# Patient Record
Sex: FEMALE | Race: WHITE | HISPANIC OR LATINO | Employment: OTHER | URBAN - METROPOLITAN AREA
[De-identification: names, ages, dates, MRNs, and addresses within clinical notes are randomized per-mention and may not be internally consistent; named-entity substitution may affect disease eponyms.]

---

## 2018-01-01 ENCOUNTER — ANESTHESIA (OUTPATIENT)
Dept: ENDOSCOPY | Facility: HOSPITAL | Age: 68
DRG: 020 | End: 2018-01-01
Payer: MEDICAID

## 2018-01-01 ENCOUNTER — ANESTHESIA EVENT (OUTPATIENT)
Dept: ENDOSCOPY | Facility: HOSPITAL | Age: 68
DRG: 020 | End: 2018-01-01
Payer: MEDICAID

## 2018-01-01 ENCOUNTER — HOSPITAL ENCOUNTER (INPATIENT)
Facility: HOSPITAL | Age: 68
LOS: 1 days | DRG: 020 | End: 2018-05-25
Attending: EMERGENCY MEDICINE | Admitting: PSYCHIATRY & NEUROLOGY
Payer: MEDICAID

## 2018-01-01 VITALS
RESPIRATION RATE: 19 BRPM | SYSTOLIC BLOOD PRESSURE: 201 MMHG | OXYGEN SATURATION: 99 % | HEIGHT: 60 IN | WEIGHT: 155 LBS | TEMPERATURE: 98 F | HEART RATE: 62 BPM | DIASTOLIC BLOOD PRESSURE: 68 MMHG | BODY MASS INDEX: 30.43 KG/M2

## 2018-01-01 DIAGNOSIS — G44.53 PRIMARY THUNDERCLAP HEADACHE: ICD-10-CM

## 2018-01-01 DIAGNOSIS — I60.9 SUBARACHNOID HEMORRHAGE: Primary | ICD-10-CM

## 2018-01-01 DIAGNOSIS — I63.9 STROKE: ICD-10-CM

## 2018-01-01 DIAGNOSIS — I10 ESSENTIAL HYPERTENSION: ICD-10-CM

## 2018-01-01 DIAGNOSIS — R07.9 CHEST PAIN: ICD-10-CM

## 2018-01-01 DIAGNOSIS — I60.7 CEREBRAL ANEURYSM RUPTURE: ICD-10-CM

## 2018-01-01 LAB
ABO + RH BLD: NORMAL
ALBUMIN SERPL BCP-MCNC: 3.5 G/DL
ALBUMIN SERPL BCP-MCNC: 3.9 G/DL
ALLENS TEST: ABNORMAL
ALP SERPL-CCNC: 132 U/L
ALP SERPL-CCNC: 138 U/L
ALT SERPL W/O P-5'-P-CCNC: 32 U/L
ALT SERPL W/O P-5'-P-CCNC: 34 U/L
ANION GAP SERPL CALC-SCNC: 10 MMOL/L
ANION GAP SERPL CALC-SCNC: 13 MMOL/L
APTT BLDCRRT: 21.6 SEC
APTT BLDCRRT: 22.2 SEC
APTT BLDCRRT: <21 SEC
AST SERPL-CCNC: 32 U/L
AST SERPL-CCNC: 38 U/L
BASOPHILS # BLD AUTO: 0.03 K/UL
BASOPHILS # BLD AUTO: 0.05 K/UL
BASOPHILS NFR BLD: 0.3 %
BASOPHILS NFR BLD: 0.4 %
BILIRUB SERPL-MCNC: 0.6 MG/DL
BILIRUB SERPL-MCNC: 0.7 MG/DL
BLD GP AB SCN CELLS X3 SERPL QL: NORMAL
BUN SERPL-MCNC: 15 MG/DL
BUN SERPL-MCNC: 9 MG/DL
CALCIUM SERPL-MCNC: 8.7 MG/DL
CALCIUM SERPL-MCNC: 8.9 MG/DL
CHLORIDE SERPL-SCNC: 106 MMOL/L
CHLORIDE SERPL-SCNC: 114 MMOL/L
CHOLEST SERPL-MCNC: 185 MG/DL
CHOLEST/HDLC SERPL: 6 {RATIO}
CO2 SERPL-SCNC: 18 MMOL/L
CO2 SERPL-SCNC: 23 MMOL/L
CREAT SERPL-MCNC: 0.7 MG/DL
CREAT SERPL-MCNC: 0.8 MG/DL
DELSYS: ABNORMAL
DIFFERENTIAL METHOD: ABNORMAL
DIFFERENTIAL METHOD: ABNORMAL
EOSINOPHIL # BLD AUTO: 0 K/UL
EOSINOPHIL # BLD AUTO: 0.2 K/UL
EOSINOPHIL NFR BLD: 0.1 %
EOSINOPHIL NFR BLD: 2 %
ERYTHROCYTE [DISTWIDTH] IN BLOOD BY AUTOMATED COUNT: 14.1 %
ERYTHROCYTE [DISTWIDTH] IN BLOOD BY AUTOMATED COUNT: 14.2 %
ERYTHROCYTE [SEDIMENTATION RATE] IN BLOOD BY WESTERGREN METHOD: 12 MM/H
EST. GFR  (AFRICAN AMERICAN): >60 ML/MIN/1.73 M^2
EST. GFR  (AFRICAN AMERICAN): >60 ML/MIN/1.73 M^2
EST. GFR  (NON AFRICAN AMERICAN): >60 ML/MIN/1.73 M^2
EST. GFR  (NON AFRICAN AMERICAN): >60 ML/MIN/1.73 M^2
ESTIMATED AVG GLUCOSE: 114 MG/DL
FIO2: 40
GLUCOSE SERPL-MCNC: 162 MG/DL
GLUCOSE SERPL-MCNC: 345 MG/DL
HBA1C MFR BLD HPLC: 5.6 %
HCO3 UR-SCNC: 17.9 MMOL/L (ref 24–28)
HCT VFR BLD AUTO: 43.5 %
HCT VFR BLD AUTO: 44.4 %
HDLC SERPL-MCNC: 31 MG/DL
HDLC SERPL: 16.8 %
HGB BLD-MCNC: 14.3 G/DL
HGB BLD-MCNC: 14.7 G/DL
IMM GRANULOCYTES # BLD AUTO: 0.07 K/UL
IMM GRANULOCYTES NFR BLD AUTO: 0.5 %
INR PPP: 1
LDLC SERPL CALC-MCNC: 124.8 MG/DL
LYMPHOCYTES # BLD AUTO: 1.3 K/UL
LYMPHOCYTES # BLD AUTO: 1.6 K/UL
LYMPHOCYTES NFR BLD: 21.5 %
LYMPHOCYTES NFR BLD: 8.6 %
MAGNESIUM SERPL-MCNC: 2.1 MG/DL
MCH RBC QN AUTO: 30.5 PG
MCH RBC QN AUTO: 30.6 PG
MCHC RBC AUTO-ENTMCNC: 32.9 G/DL
MCHC RBC AUTO-ENTMCNC: 33.1 G/DL
MCV RBC AUTO: 92 FL
MCV RBC AUTO: 93 FL
MODE: ABNORMAL
MONOCYTES # BLD AUTO: 0.3 K/UL
MONOCYTES # BLD AUTO: 0.9 K/UL
MONOCYTES NFR BLD: 3.5 %
MONOCYTES NFR BLD: 6 %
NEUTROPHILS # BLD AUTO: 13 K/UL
NEUTROPHILS # BLD AUTO: 5.4 K/UL
NEUTROPHILS NFR BLD: 72.6 %
NEUTROPHILS NFR BLD: 84.5 %
NONHDLC SERPL-MCNC: 154 MG/DL
NRBC BLD-RTO: 0 /100 WBC
PCO2 BLDA: 30.1 MMHG (ref 35–45)
PEEP: 5
PH SMN: 7.38 [PH] (ref 7.35–7.45)
PHOSPHATE SERPL-MCNC: 1.6 MG/DL
PLATELET # BLD AUTO: 191 K/UL
PLATELET # BLD AUTO: 283 K/UL
PMV BLD AUTO: 11.5 FL
PMV BLD AUTO: 11.5 FL
PO2 BLDA: 115 MMHG (ref 80–100)
POC BE: -7 MMOL/L
POC SATURATED O2: 99 % (ref 95–100)
POC TCO2: 19 MMOL/L (ref 23–27)
POCT GLUCOSE: 255 MG/DL (ref 70–110)
POCT GLUCOSE: 300 MG/DL (ref 70–110)
POTASSIUM SERPL-SCNC: 3.1 MMOL/L
POTASSIUM SERPL-SCNC: 3.9 MMOL/L
PROT SERPL-MCNC: 7.3 G/DL
PROT SERPL-MCNC: 7.6 G/DL
PROTHROMBIN TIME: 10.7 SEC
PROTHROMBIN TIME: 10.9 SEC
PROTHROMBIN TIME: 10.9 SEC
RBC # BLD AUTO: 4.68 M/UL
RBC # BLD AUTO: 4.82 M/UL
SAMPLE: ABNORMAL
SITE: ABNORMAL
SODIUM SERPL-SCNC: 139 MMOL/L
SODIUM SERPL-SCNC: 145 MMOL/L
SP02: 100
T4 FREE SERPL-MCNC: 0.68 NG/DL
TRIGL SERPL-MCNC: 146 MG/DL
TROPONIN I SERPL DL<=0.01 NG/ML-MCNC: <0.006 NG/ML
TSH SERPL DL<=0.005 MIU/L-ACNC: 8.93 UIU/ML
VT: 440
WBC # BLD AUTO: 15.38 K/UL
WBC # BLD AUTO: 7.43 K/UL

## 2018-01-01 PROCEDURE — 85610 PROTHROMBIN TIME: CPT

## 2018-01-01 PROCEDURE — 25000003 PHARM REV CODE 250: Performed by: STUDENT IN AN ORGANIZED HEALTH CARE EDUCATION/TRAINING PROGRAM

## 2018-01-01 PROCEDURE — 86901 BLOOD TYPING SEROLOGIC RH(D): CPT

## 2018-01-01 PROCEDURE — 94761 N-INVAS EAR/PLS OXIMETRY MLT: CPT

## 2018-01-01 PROCEDURE — 99233 SBSQ HOSP IP/OBS HIGH 50: CPT | Mod: ,,, | Performed by: PSYCHIATRY & NEUROLOGY

## 2018-01-01 PROCEDURE — 99900026 HC AIRWAY MAINTENANCE (STAT)

## 2018-01-01 PROCEDURE — 99233 SBSQ HOSP IP/OBS HIGH 50: CPT | Mod: ,,, | Performed by: NEUROLOGICAL SURGERY

## 2018-01-01 PROCEDURE — 82803 BLOOD GASES ANY COMBINATION: CPT

## 2018-01-01 PROCEDURE — 63600175 PHARM REV CODE 636 W HCPCS: Performed by: STUDENT IN AN ORGANIZED HEALTH CARE EDUCATION/TRAINING PROGRAM

## 2018-01-01 PROCEDURE — 85025 COMPLETE CBC W/AUTO DIFF WBC: CPT

## 2018-01-01 PROCEDURE — 31500 INSERT EMERGENCY AIRWAY: CPT | Mod: ,,, | Performed by: PSYCHIATRY & NEUROLOGY

## 2018-01-01 PROCEDURE — 84443 ASSAY THYROID STIM HORMONE: CPT

## 2018-01-01 PROCEDURE — 63600175 PHARM REV CODE 636 W HCPCS: Performed by: PHYSICIAN ASSISTANT

## 2018-01-01 PROCEDURE — 96366 THER/PROPH/DIAG IV INF ADDON: CPT

## 2018-01-01 PROCEDURE — 25000003 PHARM REV CODE 250: Performed by: PHYSICIAN ASSISTANT

## 2018-01-01 PROCEDURE — 85730 THROMBOPLASTIN TIME PARTIAL: CPT

## 2018-01-01 PROCEDURE — A4216 STERILE WATER/SALINE, 10 ML: HCPCS | Performed by: STUDENT IN AN ORGANIZED HEALTH CARE EDUCATION/TRAINING PROGRAM

## 2018-01-01 PROCEDURE — 83735 ASSAY OF MAGNESIUM: CPT

## 2018-01-01 PROCEDURE — 96365 THER/PROPH/DIAG IV INF INIT: CPT

## 2018-01-01 PROCEDURE — B31FYZZ FLUOROSCOPY OF LEFT VERTEBRAL ARTERY USING OTHER CONTRAST: ICD-10-PCS | Performed by: RADIOLOGY

## 2018-01-01 PROCEDURE — 94003 VENT MGMT INPAT SUBQ DAY: CPT

## 2018-01-01 PROCEDURE — B315YZZ FLUOROSCOPY OF BILATERAL COMMON CAROTID ARTERIES USING OTHER CONTRAST: ICD-10-PCS | Performed by: RADIOLOGY

## 2018-01-01 PROCEDURE — 25000003 PHARM REV CODE 250

## 2018-01-01 PROCEDURE — 99291 CRITICAL CARE FIRST HOUR: CPT | Mod: 25,,, | Performed by: PSYCHIATRY & NEUROLOGY

## 2018-01-01 PROCEDURE — 93010 ELECTROCARDIOGRAM REPORT: CPT | Mod: ,,, | Performed by: INTERNAL MEDICINE

## 2018-01-01 PROCEDURE — 27000221 HC OXYGEN, UP TO 24 HOURS

## 2018-01-01 PROCEDURE — 5A1945Z RESPIRATORY VENTILATION, 24-96 CONSECUTIVE HOURS: ICD-10-PCS | Performed by: PSYCHIATRY & NEUROLOGY

## 2018-01-01 PROCEDURE — 83036 HEMOGLOBIN GLYCOSYLATED A1C: CPT

## 2018-01-01 PROCEDURE — D9220A PRA ANESTHESIA: Mod: ANES,,, | Performed by: ANESTHESIOLOGY

## 2018-01-01 PROCEDURE — 80061 LIPID PANEL: CPT

## 2018-01-01 PROCEDURE — 99900035 HC TECH TIME PER 15 MIN (STAT)

## 2018-01-01 PROCEDURE — 99233 SBSQ HOSP IP/OBS HIGH 50: CPT | Mod: ,,, | Performed by: PHYSICIAN ASSISTANT

## 2018-01-01 PROCEDURE — 36620 INSERTION CATHETER ARTERY: CPT | Mod: ,,, | Performed by: PSYCHIATRY & NEUROLOGY

## 2018-01-01 PROCEDURE — 96375 TX/PRO/DX INJ NEW DRUG ADDON: CPT | Mod: 59

## 2018-01-01 PROCEDURE — 85610 PROTHROMBIN TIME: CPT | Mod: 91

## 2018-01-01 PROCEDURE — 99291 CRITICAL CARE FIRST HOUR: CPT | Mod: 25

## 2018-01-01 PROCEDURE — 63600175 PHARM REV CODE 636 W HCPCS

## 2018-01-01 PROCEDURE — 37000008 HC ANESTHESIA 1ST 15 MINUTES

## 2018-01-01 PROCEDURE — S0017 INJECTION, AMINOCAPROIC ACID: HCPCS | Performed by: PSYCHIATRY & NEUROLOGY

## 2018-01-01 PROCEDURE — 96374 THER/PROPH/DIAG INJ IV PUSH: CPT | Mod: 59

## 2018-01-01 PROCEDURE — 84439 ASSAY OF FREE THYROXINE: CPT

## 2018-01-01 PROCEDURE — 99232 SBSQ HOSP IP/OBS MODERATE 35: CPT | Mod: ,,, | Performed by: NEUROLOGICAL SURGERY

## 2018-01-01 PROCEDURE — 0BH17EZ INSERTION OF ENDOTRACHEAL AIRWAY INTO TRACHEA, VIA NATURAL OR ARTIFICIAL OPENING: ICD-10-PCS | Performed by: PSYCHIATRY & NEUROLOGY

## 2018-01-01 PROCEDURE — 00H632Z INSERTION OF MONITORING DEVICE INTO CEREBRAL VENTRICLE, PERCUTANEOUS APPROACH: ICD-10-PCS | Performed by: PSYCHIATRY & NEUROLOGY

## 2018-01-01 PROCEDURE — 25000003 PHARM REV CODE 250: Performed by: NURSE ANESTHETIST, CERTIFIED REGISTERED

## 2018-01-01 PROCEDURE — 80053 COMPREHEN METABOLIC PANEL: CPT

## 2018-01-01 PROCEDURE — 94002 VENT MGMT INPAT INIT DAY: CPT

## 2018-01-01 PROCEDURE — 63600175 PHARM REV CODE 636 W HCPCS: Performed by: NURSE ANESTHETIST, CERTIFIED REGISTERED

## 2018-01-01 PROCEDURE — 37799 UNLISTED PX VASCULAR SURGERY: CPT

## 2018-01-01 PROCEDURE — D9220A PRA ANESTHESIA: Mod: CRNA,,, | Performed by: NURSE ANESTHETIST, CERTIFIED REGISTERED

## 2018-01-01 PROCEDURE — B318YZZ FLUOROSCOPY OF BILATERAL INTERNAL CAROTID ARTERIES USING OTHER CONTRAST: ICD-10-PCS | Performed by: RADIOLOGY

## 2018-01-01 PROCEDURE — 25000003 PHARM REV CODE 250: Performed by: RADIOLOGY

## 2018-01-01 PROCEDURE — 25000003 PHARM REV CODE 250: Performed by: PSYCHIATRY & NEUROLOGY

## 2018-01-01 PROCEDURE — 4A103BD MONITORING OF INTRACRANIAL PRESSURE, PERCUTANEOUS APPROACH: ICD-10-PCS | Performed by: PSYCHIATRY & NEUROLOGY

## 2018-01-01 PROCEDURE — 25500020 PHARM REV CODE 255: Performed by: PSYCHIATRY & NEUROLOGY

## 2018-01-01 PROCEDURE — 20000000 HC ICU ROOM

## 2018-01-01 PROCEDURE — 99292 CRITICAL CARE ADDL 30 MIN: CPT | Mod: 25,,, | Performed by: PSYCHIATRY & NEUROLOGY

## 2018-01-01 PROCEDURE — 63600175 PHARM REV CODE 636 W HCPCS: Performed by: EMERGENCY MEDICINE

## 2018-01-01 PROCEDURE — 03VG3DZ RESTRICTION OF INTRACRANIAL ARTERY WITH INTRALUMINAL DEVICE, PERCUTANEOUS APPROACH: ICD-10-PCS | Performed by: RADIOLOGY

## 2018-01-01 PROCEDURE — 37000009 HC ANESTHESIA EA ADD 15 MINS

## 2018-01-01 PROCEDURE — 25000003 PHARM REV CODE 250: Performed by: EMERGENCY MEDICINE

## 2018-01-01 PROCEDURE — 85730 THROMBOPLASTIN TIME PARTIAL: CPT | Mod: 91

## 2018-01-01 PROCEDURE — 84100 ASSAY OF PHOSPHORUS: CPT

## 2018-01-01 PROCEDURE — 84484 ASSAY OF TROPONIN QUANT: CPT

## 2018-01-01 RX ORDER — GLYCOPYRROLATE 0.2 MG/ML
0.2 INJECTION INTRAMUSCULAR; INTRAVENOUS ONCE
Status: DISCONTINUED | OUTPATIENT
Start: 2018-01-01 | End: 2018-01-01

## 2018-01-01 RX ORDER — LEVETIRACETAM 5 MG/ML
500 INJECTION INTRAVASCULAR EVERY 12 HOURS
Status: DISCONTINUED | OUTPATIENT
Start: 2018-01-01 | End: 2018-01-01

## 2018-01-01 RX ORDER — NICARDIPINE HYDROCHLORIDE 0.2 MG/ML
5 INJECTION INTRAVENOUS CONTINUOUS
Status: DISCONTINUED | OUTPATIENT
Start: 2018-01-01 | End: 2018-01-01

## 2018-01-01 RX ORDER — SODIUM CHLORIDE 0.9 % (FLUSH) 0.9 %
5 SYRINGE (ML) INJECTION
Status: DISCONTINUED | OUTPATIENT
Start: 2018-01-01 | End: 2018-01-01

## 2018-01-01 RX ORDER — SODIUM CHLORIDE 0.9 % (FLUSH) 0.9 %
3 SYRINGE (ML) INJECTION EVERY 8 HOURS
Status: DISCONTINUED | OUTPATIENT
Start: 2018-01-01 | End: 2018-01-01

## 2018-01-01 RX ORDER — NEOSTIGMINE METHYLSULFATE 1 MG/ML
2 INJECTION, SOLUTION INTRAVENOUS ONCE
Status: DISCONTINUED | OUTPATIENT
Start: 2018-01-01 | End: 2018-01-01

## 2018-01-01 RX ORDER — NICARDIPINE HYDROCHLORIDE 0.2 MG/ML
1 INJECTION INTRAVENOUS CONTINUOUS
Status: DISCONTINUED | OUTPATIENT
Start: 2018-01-01 | End: 2018-01-01

## 2018-01-01 RX ORDER — MIDAZOLAM HYDROCHLORIDE 1 MG/ML
INJECTION, SOLUTION INTRAMUSCULAR; INTRAVENOUS
Status: DISCONTINUED | OUTPATIENT
Start: 2018-01-01 | End: 2018-01-01

## 2018-01-01 RX ORDER — SODIUM,POTASSIUM PHOSPHATES 280-250MG
2 POWDER IN PACKET (EA) ORAL
Status: DISCONTINUED | OUTPATIENT
Start: 2018-01-01 | End: 2018-01-01

## 2018-01-01 RX ORDER — NAPROXEN SODIUM 220 MG/1
81 TABLET, FILM COATED ORAL DAILY
Status: DISCONTINUED | OUTPATIENT
Start: 2018-01-01 | End: 2018-01-01

## 2018-01-01 RX ORDER — FENTANYL CITRATE 50 UG/ML
50 INJECTION, SOLUTION INTRAMUSCULAR; INTRAVENOUS
Status: COMPLETED | OUTPATIENT
Start: 2018-01-01 | End: 2018-01-01

## 2018-01-01 RX ORDER — LABETALOL HYDROCHLORIDE 5 MG/ML
10 INJECTION, SOLUTION INTRAVENOUS EVERY 4 HOURS PRN
Status: DISCONTINUED | OUTPATIENT
Start: 2018-01-01 | End: 2018-01-01

## 2018-01-01 RX ORDER — ETOMIDATE 2 MG/ML
30 INJECTION INTRAVENOUS ONCE
Status: COMPLETED | OUTPATIENT
Start: 2018-01-01 | End: 2018-01-01

## 2018-01-01 RX ORDER — ATROPINE SULFATE 0.1 MG/ML
1 INJECTION INTRAVENOUS ONCE
Status: COMPLETED | OUTPATIENT
Start: 2018-01-01 | End: 2018-01-01

## 2018-01-01 RX ORDER — ATROPINE SULFATE 0.1 MG/ML
INJECTION INTRAVENOUS
Status: COMPLETED
Start: 2018-01-01 | End: 2018-01-01

## 2018-01-01 RX ORDER — NOREPINEPHRINE BITARTRATE/D5W 4MG/250ML
PLASTIC BAG, INJECTION (ML) INTRAVENOUS
Status: COMPLETED
Start: 2018-01-01 | End: 2018-01-01

## 2018-01-01 RX ORDER — POTASSIUM CHLORIDE 20 MEQ/15ML
40 SOLUTION ORAL
Status: DISCONTINUED | OUTPATIENT
Start: 2018-01-01 | End: 2018-01-01

## 2018-01-01 RX ORDER — MORPHINE SULFATE 2 MG/ML
1 INJECTION, SOLUTION INTRAMUSCULAR; INTRAVENOUS
Status: DISCONTINUED | OUTPATIENT
Start: 2018-01-01 | End: 2018-01-01 | Stop reason: HOSPADM

## 2018-01-01 RX ORDER — NOREPINEPHRINE BITARTRATE/D5W 4MG/250ML
0.02 PLASTIC BAG, INJECTION (ML) INTRAVENOUS CONTINUOUS
Status: DISCONTINUED | OUTPATIENT
Start: 2018-01-01 | End: 2018-01-01

## 2018-01-01 RX ORDER — FAMOTIDINE 20 MG/1
20 TABLET, FILM COATED ORAL 2 TIMES DAILY
Status: DISCONTINUED | OUTPATIENT
Start: 2018-01-01 | End: 2018-01-01

## 2018-01-01 RX ORDER — ETOMIDATE 2 MG/ML
INJECTION INTRAVENOUS
Status: DISCONTINUED
Start: 2018-01-01 | End: 2018-01-01 | Stop reason: WASHOUT

## 2018-01-01 RX ORDER — HEPARIN SODIUM 1000 [USP'U]/ML
INJECTION, SOLUTION INTRAVENOUS; SUBCUTANEOUS
Status: DISCONTINUED | OUTPATIENT
Start: 2018-01-01 | End: 2018-01-01

## 2018-01-01 RX ORDER — PROPOFOL 10 MG/ML
VIAL (ML) INTRAVENOUS
Status: DISCONTINUED | OUTPATIENT
Start: 2018-01-01 | End: 2018-01-01

## 2018-01-01 RX ORDER — MANNITOL 250 MG/ML
75 INJECTION, SOLUTION INTRAVENOUS ONCE
Status: COMPLETED | OUTPATIENT
Start: 2018-01-01 | End: 2018-01-01

## 2018-01-01 RX ORDER — ACETAMINOPHEN 10 MG/ML
1000 INJECTION, SOLUTION INTRAVENOUS ONCE
Status: COMPLETED | OUTPATIENT
Start: 2018-01-01 | End: 2018-01-01

## 2018-01-01 RX ORDER — ROCURONIUM BROMIDE 10 MG/ML
INJECTION, SOLUTION INTRAVENOUS
Status: DISCONTINUED | OUTPATIENT
Start: 2018-01-01 | End: 2018-01-01

## 2018-01-01 RX ORDER — GLYCOPYRROLATE 0.2 MG/ML
0.1 INJECTION INTRAMUSCULAR; INTRAVENOUS
Status: DISCONTINUED | OUTPATIENT
Start: 2018-01-01 | End: 2018-01-01 | Stop reason: HOSPADM

## 2018-01-01 RX ORDER — PROPOFOL 10 MG/ML
5 INJECTION, EMULSION INTRAVENOUS CONTINUOUS
Status: DISCONTINUED | OUTPATIENT
Start: 2018-01-01 | End: 2018-01-01

## 2018-01-01 RX ORDER — FENTANYL CITRATE 50 UG/ML
INJECTION, SOLUTION INTRAMUSCULAR; INTRAVENOUS
Status: COMPLETED
Start: 2018-01-01 | End: 2018-01-01

## 2018-01-01 RX ORDER — ROCURONIUM BROMIDE 10 MG/ML
INJECTION, SOLUTION INTRAVENOUS
Status: DISCONTINUED
Start: 2018-01-01 | End: 2018-01-01 | Stop reason: WASHOUT

## 2018-01-01 RX ORDER — PROCHLORPERAZINE EDISYLATE 5 MG/ML
10 INJECTION INTRAMUSCULAR; INTRAVENOUS ONCE
Status: COMPLETED | OUTPATIENT
Start: 2018-01-01 | End: 2018-01-01

## 2018-01-01 RX ORDER — LABETALOL HYDROCHLORIDE 5 MG/ML
10 INJECTION, SOLUTION INTRAVENOUS
Status: DISCONTINUED | OUTPATIENT
Start: 2018-01-01 | End: 2018-01-01

## 2018-01-01 RX ORDER — LANOLIN ALCOHOL/MO/W.PET/CERES
800 CREAM (GRAM) TOPICAL
Status: DISCONTINUED | OUTPATIENT
Start: 2018-01-01 | End: 2018-01-01

## 2018-01-01 RX ORDER — LIDOCAINE HYDROCHLORIDE AND EPINEPHRINE 10; 10 MG/ML; UG/ML
20 INJECTION, SOLUTION INFILTRATION; PERINEURAL ONCE
Status: DISCONTINUED | OUTPATIENT
Start: 2018-01-01 | End: 2018-01-01

## 2018-01-01 RX ORDER — MANNITOL 250 MG/ML
70 INJECTION, SOLUTION INTRAVENOUS ONCE
Status: DISCONTINUED | OUTPATIENT
Start: 2018-01-01 | End: 2018-01-01

## 2018-01-01 RX ORDER — HYDRALAZINE HYDROCHLORIDE 20 MG/ML
10 INJECTION INTRAMUSCULAR; INTRAVENOUS EVERY 4 HOURS PRN
Status: DISCONTINUED | OUTPATIENT
Start: 2018-01-01 | End: 2018-01-01

## 2018-01-01 RX ORDER — FENTANYL CITRATE 50 UG/ML
INJECTION, SOLUTION INTRAMUSCULAR; INTRAVENOUS
Status: DISCONTINUED | OUTPATIENT
Start: 2018-01-01 | End: 2018-01-01

## 2018-01-01 RX ORDER — ROCURONIUM BROMIDE 10 MG/ML
INJECTION, SOLUTION INTRAVENOUS
Status: DISPENSED
Start: 2018-01-01 | End: 2018-01-01

## 2018-01-01 RX ORDER — PROPOFOL 10 MG/ML
INJECTION, EMULSION INTRAVENOUS
Status: DISPENSED
Start: 2018-01-01 | End: 2018-01-01

## 2018-01-01 RX ORDER — METOCLOPRAMIDE HYDROCHLORIDE 5 MG/ML
10 INJECTION INTRAMUSCULAR; INTRAVENOUS EVERY 6 HOURS PRN
Status: DISCONTINUED | OUTPATIENT
Start: 2018-01-01 | End: 2018-01-01 | Stop reason: HOSPADM

## 2018-01-01 RX ORDER — MANNITOL 250 MG/ML
INJECTION, SOLUTION INTRAVENOUS
Status: COMPLETED
Start: 2018-01-01 | End: 2018-01-01

## 2018-01-01 RX ORDER — PHENYLEPHRINE HYDROCHLORIDE 10 MG/ML
INJECTION INTRAVENOUS
Status: DISPENSED
Start: 2018-01-01 | End: 2018-01-01

## 2018-01-01 RX ORDER — ASPIRIN 300 MG/1
300 SUPPOSITORY RECTAL ONCE
Status: COMPLETED | OUTPATIENT
Start: 2018-01-01 | End: 2018-01-01

## 2018-01-01 RX ORDER — PHENYLEPHRINE HCL IN 0.9% NACL 1 MG/10 ML
SYRINGE (ML) INTRAVENOUS
Status: COMPLETED
Start: 2018-01-01 | End: 2018-01-01

## 2018-01-01 RX ORDER — ROCURONIUM BROMIDE 10 MG/ML
1 INJECTION, SOLUTION INTRAVENOUS ONCE
Status: COMPLETED | OUTPATIENT
Start: 2018-01-01 | End: 2018-01-01

## 2018-01-01 RX ORDER — LORAZEPAM 1 MG/1
1 TABLET ORAL EVERY 30 MIN PRN
Status: DISCONTINUED | OUTPATIENT
Start: 2018-01-01 | End: 2018-01-01 | Stop reason: HOSPADM

## 2018-01-01 RX ORDER — ONDANSETRON 2 MG/ML
8 INJECTION INTRAMUSCULAR; INTRAVENOUS
Status: COMPLETED | OUTPATIENT
Start: 2018-01-01 | End: 2018-01-01

## 2018-01-01 RX ORDER — FENTANYL CITRATE 50 UG/ML
INJECTION, SOLUTION INTRAMUSCULAR; INTRAVENOUS
Status: DISCONTINUED
Start: 2018-01-01 | End: 2018-01-01 | Stop reason: WASHOUT

## 2018-01-01 RX ORDER — AMOXICILLIN 250 MG
1 CAPSULE ORAL 2 TIMES DAILY
Status: DISCONTINUED | OUTPATIENT
Start: 2018-01-01 | End: 2018-01-01

## 2018-01-01 RX ADMIN — FENTANYL CITRATE 50 MCG: 50 INJECTION, SOLUTION INTRAMUSCULAR; INTRAVENOUS at 04:05

## 2018-01-01 RX ADMIN — Medication 3 ML: at 10:05

## 2018-01-01 RX ADMIN — ETOMIDATE 30 MG: 2 INJECTION, SOLUTION INTRAVENOUS at 02:05

## 2018-01-01 RX ADMIN — FENTANYL CITRATE 100 MCG: 50 INJECTION, SOLUTION INTRAMUSCULAR; INTRAVENOUS at 02:05

## 2018-01-01 RX ADMIN — ASPIRIN 81 MG 81 MG: 81 TABLET ORAL at 09:05

## 2018-01-01 RX ADMIN — ROCURONIUM BROMIDE 70 MG: 10 INJECTION, SOLUTION INTRAVENOUS at 02:05

## 2018-01-01 RX ADMIN — ROCURONIUM BROMIDE 30 MG: 10 INJECTION, SOLUTION INTRAVENOUS at 04:05

## 2018-01-01 RX ADMIN — MIDAZOLAM HYDROCHLORIDE 2 MG: 1 INJECTION, SOLUTION INTRAMUSCULAR; INTRAVENOUS at 04:05

## 2018-01-01 RX ADMIN — FENTANYL CITRATE 50 MCG: 50 INJECTION, SOLUTION INTRAMUSCULAR; INTRAVENOUS at 10:05

## 2018-01-01 RX ADMIN — ACETAMINOPHEN 1000 MG: 10 INJECTION, SOLUTION INTRAVENOUS at 12:05

## 2018-01-01 RX ADMIN — LEVETIRACETAM 500 MG: 5 INJECTION INTRAVENOUS at 09:05

## 2018-01-01 RX ADMIN — PROPOFOL 50 MG: 10 INJECTION, EMULSION INTRAVENOUS at 04:05

## 2018-01-01 RX ADMIN — POTASSIUM CHLORIDE 40 MEQ: 20 SOLUTION ORAL at 09:05

## 2018-01-01 RX ADMIN — NIMODIPINE 60 MG: 60 SOLUTION ORAL at 02:05

## 2018-01-01 RX ADMIN — CEFTRIAXONE SODIUM 2 G: 2 INJECTION, POWDER, FOR SOLUTION INTRAMUSCULAR; INTRAVENOUS at 01:05

## 2018-01-01 RX ADMIN — MANNITOL 75 G: 250 INJECTION, SOLUTION INTRAVENOUS at 10:05

## 2018-01-01 RX ADMIN — NIMODIPINE 60 MG: 60 SOLUTION ORAL at 09:05

## 2018-01-01 RX ADMIN — Medication 3 ML: at 06:05

## 2018-01-01 RX ADMIN — Medication 3 ML: at 02:05

## 2018-01-01 RX ADMIN — FAMOTIDINE 20 MG: 20 TABLET ORAL at 11:05

## 2018-01-01 RX ADMIN — MORPHINE SULFATE 1 MG: 2 INJECTION, SOLUTION INTRAMUSCULAR; INTRAVENOUS at 03:05

## 2018-01-01 RX ADMIN — LEVETIRACETAM 500 MG: 5 INJECTION INTRAVENOUS at 10:05

## 2018-01-01 RX ADMIN — PROCHLORPERAZINE EDISYLATE 10 MG: 5 INJECTION INTRAMUSCULAR; INTRAVENOUS at 10:05

## 2018-01-01 RX ADMIN — NIMODIPINE 60 MG: 60 SOLUTION ORAL at 11:05

## 2018-01-01 RX ADMIN — Medication 0.3 MCG/KG/MIN: at 05:05

## 2018-01-01 RX ADMIN — STANDARDIZED SENNA CONCENTRATE AND DOCUSATE SODIUM 1 TABLET: 8.6; 5 TABLET, FILM COATED ORAL at 10:05

## 2018-01-01 RX ADMIN — NICARDIPINE HYDROCHLORIDE 2.5 MG/HR: 0.2 INJECTION, SOLUTION INTRAVENOUS at 08:05

## 2018-01-01 RX ADMIN — NIMODIPINE 60 MG: 60 SOLUTION ORAL at 06:05

## 2018-01-01 RX ADMIN — ATROPINE SULFATE 1 MG: 0.1 INJECTION PARENTERAL at 10:05

## 2018-01-01 RX ADMIN — POTASSIUM CHLORIDE 40 MEQ: 20 SOLUTION ORAL at 06:05

## 2018-01-01 RX ADMIN — FAMOTIDINE 20 MG: 20 TABLET ORAL at 09:05

## 2018-01-01 RX ADMIN — NICARDIPINE HYDROCHLORIDE 1 MG/HR: 0.2 INJECTION, SOLUTION INTRAVENOUS at 10:05

## 2018-01-01 RX ADMIN — POTASSIUM & SODIUM PHOSPHATES POWDER PACK 280-160-250 MG 2 PACKET: 280-160-250 PACK at 06:05

## 2018-01-01 RX ADMIN — HYDRALAZINE HYDROCHLORIDE 10 MG: 20 INJECTION INTRAMUSCULAR; INTRAVENOUS at 09:05

## 2018-01-01 RX ADMIN — Medication 0.38 MCG/KG/MIN: at 09:05

## 2018-01-01 RX ADMIN — SODIUM CHLORIDE, SODIUM GLUCONATE, SODIUM ACETATE, POTASSIUM CHLORIDE, MAGNESIUM CHLORIDE, SODIUM PHOSPHATE, DIBASIC, AND POTASSIUM PHOSPHATE: .53; .5; .37; .037; .03; .012; .00082 INJECTION, SOLUTION INTRAVENOUS at 04:05

## 2018-01-01 RX ADMIN — IOHEXOL 175 ML: 300 INJECTION, SOLUTION INTRAVENOUS at 07:05

## 2018-01-01 RX ADMIN — ONDANSETRON HYDROCHLORIDE 8 MG: 2 INJECTION INTRAMUSCULAR; INTRAVENOUS at 10:05

## 2018-01-01 RX ADMIN — MORPHINE SULFATE 1 MG/HR: 10 INJECTION INTRAVENOUS at 03:05

## 2018-01-01 RX ADMIN — SODIUM CHLORIDE: 9 INJECTION, SOLUTION INTRAVENOUS at 11:05

## 2018-01-01 RX ADMIN — ASPIRIN 300 MG: 300 SUPPOSITORY RECTAL at 07:05

## 2018-01-01 RX ADMIN — HEPARIN SODIUM 3000 UNITS: 1000 INJECTION, SOLUTION INTRAVENOUS; SUBCUTANEOUS at 06:05

## 2018-01-01 RX ADMIN — Medication 0.02 MCG/KG/MIN: at 10:05

## 2018-01-01 RX ADMIN — FENTANYL CITRATE 50 MCG: 50 INJECTION, SOLUTION INTRAMUSCULAR; INTRAVENOUS at 06:05

## 2018-01-01 RX ADMIN — FENTANYL CITRATE 50 MCG: 50 INJECTION, SOLUTION INTRAMUSCULAR; INTRAVENOUS at 05:05

## 2018-01-01 RX ADMIN — Medication 1 MG: at 06:05

## 2018-01-01 RX ADMIN — ATROPINE SULFATE 1 MG: 0.1 INJECTION INTRAVENOUS at 10:05

## 2018-01-01 RX ADMIN — POTASSIUM & SODIUM PHOSPHATES POWDER PACK 280-160-250 MG 2 PACKET: 280-160-250 PACK at 09:05

## 2018-01-01 RX ADMIN — ROCURONIUM BROMIDE 20 MG: 10 INJECTION, SOLUTION INTRAVENOUS at 06:05

## 2018-01-01 RX ADMIN — ROCURONIUM BROMIDE 20 MG: 10 INJECTION, SOLUTION INTRAVENOUS at 05:05

## 2018-01-01 RX ADMIN — SODIUM CHLORIDE 1000 ML: 9 INJECTION, SOLUTION INTRAVENOUS at 11:05

## 2018-01-01 RX ADMIN — SODIUM CHLORIDE 1000 ML: 0.9 INJECTION, SOLUTION INTRAVENOUS at 10:05

## 2018-01-01 RX ADMIN — ROCURONIUM BROMIDE 20 MG: 10 INJECTION, SOLUTION INTRAVENOUS at 04:05

## 2018-01-01 RX ADMIN — MANNITOL 75 G: 12.5 INJECTION, SOLUTION INTRAVENOUS at 10:05

## 2018-05-24 PROBLEM — I60.9 SUBARACHNOID HEMORRHAGE: Status: ACTIVE | Noted: 2018-01-01

## 2018-05-24 NOTE — PROCEDURES
"Kathie Lau is a 67 y.o. female patient.    Temp: 98.5 °F (36.9 °C) (18 0956)  Pulse: 109 (18 1435)  Resp: (!) 21 (18 1435)  BP: (!) 153/73 (18 1435)  SpO2: 100 % (18 1435)  Weight: 70.3 kg (155 lb) (18)  Height: 5' (152.4 cm) (18)       Arterial Line  Date/Time: 2018 3:15 PM  Location procedure was performed: [facility]  Performed by: DUNCAN MOBLEY.  Authorized by: DUNCAN MOBLEY   Pre-op Diagnosis: SAH  Post-operative diagnosis: SAH  Consent Done: Yes  Consent: Verbal consent obtained. Written consent obtained.  Risks and benefits: risks, benefits and alternatives were discussed  Consent given by: spouse  Procedure consent: procedure consent matches procedure scheduled  Required items: required blood products, implants, devices, and special equipment available  Patient identity confirmed: , MRN and name  Time out: Immediately prior to procedure a "time out" was called to verify the correct patient, procedure, equipment, support staff and site/side marked as required.  Preparation: Patient was prepped and draped in the usual sterile fashion.  Indications: multiple ABGs and hemodynamic monitoring  Location: right brachial  Patient sedated: no  Needle gauge: 20  Seldinger technique: Seldinger technique used  Number of attempts: 2  Complications: No  Post-procedure: dressing applied  Post-procedure CMS: normal  Patient tolerance: Patient tolerated the procedure well with no immediate complications          Duncan Mobley  2018  "

## 2018-05-24 NOTE — HPI
History from chart. Patient language barrier. Patient is about to leave for angiogram.    68 yo F with HTN and HLD who is transferred to [facility] from [facility] with subarachnoid hemorrhage noted on CT Head.  Pt initially presented to the [facility] ED this morning with complaints of acute onset of severe headache, nausea, and vomiting that started this morning at 0900 while in the shower. She denies any trauma. EMS reported /90 prior to admission, vitals otherwise normal. Pt denies taking her blood pressure medication yet today. She denies taking any blood thinners or aspirin. No prior stroke history.  She denies chest pain, SOB, diarrhea, abdominal pain.

## 2018-05-24 NOTE — SUBJECTIVE & OBJECTIVE
Medications:  Continuous Infusions:   niCARdipine Stopped (18 1223)     Scheduled Meds:   etomidate        fentaNYL        rocuronium        rocuronium        cefTRIAXone (ROCEPHIN) IVPB  2 g Intravenous Once    lidocaine-EPINEPHrine 1%-1:100,000  20 mL Other Once    senna-docusate 8.6-50 mg  1 tablet Oral BID    sodium chloride 0.9%  3 mL Intravenous Q8H     PRN Meds:hydrALAZINE, labetalol     Review of Systems   Constitutional: no fever, chills or night sweats. No changes in weight   Eyes: no visual changes   ENT: no nasal congestion or sore throat   Respiratory: no cough or shortness of breath   Cardiovascular: no chest pain or palpitations   Gastrointestinal: Positive for nausea with vomiting   Genitourinary: no hematuria or dysuria   Integument/Breast: no rash or pruritis   Hematologic/Lymphatic: no easy bruising or lymphadenopathy   Musculoskeletal: no arthralgias or myalgias.   Neurological: Positive for headaches  Behavioral/Psych: no auditory or visual hallucinations   Endocrine: no heat or cold intolerance       Objective:     Weight: 70.3 kg (155 lb)  Body mass index is 30.27 kg/m².  Vital Signs (Most Recent):  Temp: 98.5 °F (36.9 °C) (18 0956)  Pulse: 86 (18 1221)  Resp: 18 (18 1227)  BP: (!) 140/74 (18 1221)  SpO2: 100 % (18 1221) Vital Signs (24h Range):  Temp:  [98.5 °F (36.9 °C)] 98.5 °F (36.9 °C)  Pulse:  [72-86] 86  Resp:  [18-20] 18  SpO2:  [95 %-100 %] 100 %  BP: (140-190)/(68-91) 140/74        Neurosurgery Physical Exam   General: well developed, well nourished  Head: normocephalic, atraumatic  Neurologic: Somnolent, easily arouses to voice. Mild confusion.   GCS: Motor: 6/Verbal: 4/Eyes: 4 GCS Total: 14  Mental Status:Oriented to person and place (hospital) only. States she does not remember her  or current year.   Speech: No obvious dysarthria  Cranial nerves: face symmetric, tongue midline, CN II-XII grossly intact.   Eyes: pupils equal,  round, reactive to light with accomodation, EOMI.   Pulmonary: normal respirations, no signs of respiratory distress  Abdomen: soft, non-distended, not tender to palpation  Sensory: response to light touch throughout  Motor Strength:Moves all extremities spontaneously with good tone and strength. No focal deficits. Full strength upper and lower extremities. No abnormal movements seen.   Pronator Drift: no drift noted  Finger-to-nose: unable to assess 2/2 mental status  Clonus: absent  Babinski: absent        Significant Labs:    Recent Labs  Lab 05/24/18  1011   *      K 3.9      CO2 23   BUN 15   CREATININE 0.7   CALCIUM 8.9       Recent Labs  Lab 05/24/18  1011   WBC 7.43   HGB 14.7   HCT 44.4          Recent Labs  Lab 05/24/18  1011   INR 1.0   APTT <21.0         Significant Diagnostics:  Head CT:  I independently reviewed the imaging.     Subarachnoid hemorrhage.    Abnormal round attenuating structure medial left temporal fossa region, possibly suprasellar region concerning for possible rim calcified aneurysm or extra-axial or less likely intra-axial mass, further evaluation with CTA, or MRI MRA advised.

## 2018-05-24 NOTE — HOSPITAL COURSE
Admit to NCCU   05/25 overnight patient with worsening neurologic status. Neurosurgery contacted due to increased ICP with blood in EVD. CT head with worsening IVH and midline shift. Found to have fixed pupils. Concern for re-rupture of aneurysm. Family decided not to proceed with further intervention and changed patient to partial code.

## 2018-05-24 NOTE — ANESTHESIA PREPROCEDURE EVALUATION
[facility]  Anesthesia Pre-Operative Evaluation         Patient Name: Kathie Lau  YOB: 1950  MRN: [id number]    SUBJECTIVE:     Pre-operative evaluation for Procedure(s) (LRB):  Angiogram-Cerebral (N/A)     05/24/2018    Kathie Lau is a 67 y.o. female w/ a significant PMHx of HTN presented to the ED with HA found to have subarachnoid hemorrhage, likely due to ruptured aneurysm. Upon evaluation, primary team placing Arterial line.    Patient now presents for the above procedure(s).      LDA:   20 G PIV Rt hand  20 G PIV Lt AC    Prev airway: None documented.    Drips: Nicardipine      Patient Active Problem List   Diagnosis    Subarachnoid hemorrhage       Review of patient's allergies indicates:  No Known Allergies    Current Inpatient Medications:      Current Facility-Administered Medications on File Prior to Encounter   Medication Dose Route Frequency Provider Last Rate Last Dose    cefTRIAXone (ROCEPHIN) 2 g in dextrose 5 % 50 mL IVPB  2 g Intravenous Once Deven Infante MD   2 g at 05/24/18 1310    etomidate (AMIDATE) 2 mg/mL injection             fentaNYL (SUBLIMAZE) 50 mcg/mL injection             hydrALAZINE injection 10 mg  10 mg Intravenous Q4H PRN Shelby Andre MD        labetalol injection 10 mg  10 mg Intravenous Q4H PRN Shelby Andre MD        levETIRAcetam in NaCl (iso-os) IVPB 500 mg  500 mg Intravenous Q12H Shelby Andre MD        lidocaine-EPINEPHrine 1%-1:100,000 injection 20 mL  20 mL Other Once Deven Infante MD        niCARdipine 40 mg/200 mL infusion  1 mg/hr Intravenous Continuous Sally Pandya MD 12.5 mL/hr at 05/24/18 1330 2.5 mg/hr at 05/24/18 1330    niMODipine capsule 60 mg  60 mg Oral Q4H Shelby Andre MD        rocuronium (ZEMURON) 10 mg/mL injection             rocuronium (ZEMURON) 10 mg/mL injection             senna-docusate 8.6-50 mg per tablet 1 tablet  1 tablet Oral BID Shelby Andre MD         sodium chloride 0.9% flush 3 mL  3 mL Intravenous Q8H Shelby Andre MD         Current Outpatient Prescriptions on File Prior to Encounter   Medication Sig Dispense Refill    atenolol (TENORMIN) 100 MG tablet Take 100 mg by mouth once daily.      atenolol (TENORMIN) 100 MG tablet Take 100 mg by mouth once daily.      diazepam (VALIUM) 5 MG tablet Take 5 mg by mouth every 6 (six) hours as needed.      ibuprofen (ADVIL,MOTRIN) 600 MG tablet Take 600 mg by mouth 3 (three) times daily.      ondansetron (ZOFRAN) 4 MG tablet Take 1 tablet (4 mg total) by mouth every 6 (six) hours. 12 tablet 0    oxycodone-acetaminophen  mg (PERCOCET)  mg per tablet Take 1 tablet by mouth every 4 (four) hours as needed.         Past Surgical History:   Procedure Laterality Date    APPENDECTOMY         Social History     Social History    Marital status:      Spouse name: N/A    Number of children: N/A    Years of education: N/A     Occupational History    Not on file.     Social History Main Topics    Smoking status: Never Smoker    Smokeless tobacco: Not on file    Alcohol use No    Drug use: Unknown    Sexual activity: Not on file     Other Topics Concern    Not on file     Social History Narrative    ** Merged History Encounter **            OBJECTIVE:     Vital Signs Range (Last 24H):  Temp:  [36.9 °C (98.5 °F)]   Pulse:  [72-91]   Resp:  [18-28]   BP: (131-190)/(63-91)   SpO2:  [95 %-100 %]       CBC:   Recent Labs      05/24/18   1011   WBC  7.43   RBC  4.82   HGB  14.7   HCT  44.4   PLT  191   MCV  92   MCH  30.5   MCHC  33.1       CMP:   Recent Labs      05/24/18   1011   NA  139   K  3.9   CL  106   CO2  23   BUN  15   CREATININE  0.7   GLU  162*   CALCIUM  8.9   ALBUMIN  3.9   PROT  7.6   ALKPHOS  132   ALT  34   AST  38   BILITOT  0.7       INR:  Recent Labs      05/24/18   1011   INR  1.0   APTT  <21.0       Diagnostic Studies: No relevant studies.    EKG:   NSR.  T Waves Flipped: II,  III, V4, V5, V6, AVF and V3  2D ECHO:  No results found for this or any previous visit.      ASSESSMENT/PLAN:         Anesthesia Evaluation    I have reviewed the Patient Summary Reports.    I have reviewed the Nursing Notes.   I have reviewed the Medications.     Review of Systems  Anesthesia Hx:  No problems with previous Anesthesia    Social:  Non-Smoker, No Alcohol Use    Hematology/Oncology:  Hematology Normal   Oncology Normal     Cardiovascular:   Hypertension, well controlled Denies MI.    Denies Angina.    Pulmonary:  Pulmonary Normal  Denies COPD.  Denies Asthma.    Renal/:   Denies Chronic Renal Disease.     Hepatic/GI:   Denies Liver Disease.    Neurological:   SAH   Endocrine:   Denies Diabetes.           Anesthesia Plan  Type of Anesthesia, risks & benefits discussed:  Anesthesia Type:  general  Patient's Preference:   Intra-op Monitoring Plan: standard ASA monitors  Intra-op Monitoring Plan Comments:   Post Op Pain Control Plan: per primary service following discharge from PACU, IV/PO Opioids PRN and multimodal analgesia  Post Op Pain Control Plan Comments:   Induction:   IV  Beta Blocker:  Patient is on a Beta-Blocker and has received one dose within the past 24 hours (No further documentation required).       Informed Consent:    ASA Score: 3  emergent   Day of Surgery Review of History & Physical:            Ready For Surgery From Anesthesia Perspective.

## 2018-05-24 NOTE — SUBJECTIVE & OBJECTIVE
Past Medical History:   Diagnosis Date    Cataract     High cholesterol     Hyperlipemia     Hypertension      Past Surgical History:   Procedure Laterality Date    APPENDECTOMY        No current facility-administered medications on file prior to encounter.      Current Outpatient Prescriptions on File Prior to Encounter   Medication Sig Dispense Refill    atenolol (TENORMIN) 100 MG tablet Take 100 mg by mouth once daily.      atenolol (TENORMIN) 100 MG tablet Take 100 mg by mouth once daily.      diazepam (VALIUM) 5 MG tablet Take 5 mg by mouth every 6 (six) hours as needed.      ibuprofen (ADVIL,MOTRIN) 600 MG tablet Take 600 mg by mouth 3 (three) times daily.      ondansetron (ZOFRAN) 4 MG tablet Take 1 tablet (4 mg total) by mouth every 6 (six) hours. 12 tablet 0    oxycodone-acetaminophen  mg (PERCOCET)  mg per tablet Take 1 tablet by mouth every 4 (four) hours as needed.        Allergies: Patient has no known allergies.    History reviewed. No pertinent family history.  Social History   Substance Use Topics    Smoking status: Never Smoker    Smokeless tobacco: Not on file    Alcohol use No     Review of Systems   Constitutional: Negative for chills and fever.   HENT: Negative for drooling and voice change.    Eyes: Positive for visual disturbance (blurry). Negative for photophobia, pain and redness.   Respiratory: Negative for cough and shortness of breath.    Cardiovascular: Negative for chest pain and palpitations.   Gastrointestinal: Positive for nausea and vomiting.   Genitourinary: Negative for difficulty urinating and dysuria.   Musculoskeletal: Negative for neck pain and neck stiffness.   Skin: Negative for rash and wound.   Neurological: Positive for headaches. Negative for seizures, syncope, facial asymmetry, speech difficulty, weakness and numbness.     Objective:     Vitals:    Temp: 98.5 °F (36.9 °C)  Pulse: 90  BP: 134/65  MAP (mmHg): 93  Resp: (!) 24  SpO2: 97 %  O2  Device (Oxygen Therapy): room air    Temp  Min: 98.5 °F (36.9 °C)  Max: 98.5 °F (36.9 °C)  Pulse  Min: 72  Max: 91  BP  Min: 131/63  Max: 190/91  MAP (mmHg)  Min: 90  Max: 112  Resp  Min: 18  Max: 28  SpO2  Min: 95 %  Max: 100 %    No intake/output data recorded.           Physical Exam   Constitutional: She appears well-developed and well-nourished.   HENT:   Head: Normocephalic and atraumatic.   Eyes: EOM are normal. Pupils are equal, round, and reactive to light.   Neck: Normal range of motion. Neck supple.   Cardiovascular: Normal rate and regular rhythm.    Pulmonary/Chest: Effort normal and breath sounds normal. No respiratory distress.   Abdominal: Soft. She exhibits no distension. There is no tenderness.   Musculoskeletal: Normal range of motion. She exhibits no edema, tenderness or deformity.   Neurological: She is alert. She has normal strength. No cranial nerve deficit or sensory deficit. She displays no seizure activity. GCS eye subscore is 4. GCS verbal subscore is 4. GCS motor subscore is 6.   Drowsy but easily aroused.   A&Ox2, oriented to self and place only.   Skin: Skin is warm and dry.       Today I personally reviewed pertinent medications, lines/drains/airways, imaging, cardiology, lab results, microbiology results, notably:

## 2018-05-24 NOTE — CONSULTS
[facility]  Neurosurgery  Progress Note    Subjective:     History of Present Illness:  is a 67 y.o. female with a PMHx of HTN, who presents to the [facility] ED as a transfer from [facility] due to SAH and rim calcified aneurysm. She originally presented to the OSH with complaints of severe frontal HA's that radiated into the back of her head. These headaches, along with nausea and emesis, began around 09:00 this morning. She denies any known family history of aneurysms. Denies taking any blood thinners.     Of note, patient is Nepalese speaking only. History obtained via [employer].       Post-Op Info:  * No surgery found *           Medications:  Continuous Infusions:   niCARdipine Stopped (05/24/18 1223)     Scheduled Meds:   etomidate        fentaNYL        rocuronium        rocuronium        cefTRIAXone (ROCEPHIN) IVPB  2 g Intravenous Once    lidocaine-EPINEPHrine 1%-1:100,000  20 mL Other Once    senna-docusate 8.6-50 mg  1 tablet Oral BID    sodium chloride 0.9%  3 mL Intravenous Q8H     PRN Meds:hydrALAZINE, labetalol     Review of Systems   Constitutional: no fever, chills or night sweats. No changes in weight   Eyes: no visual changes   ENT: no nasal congestion or sore throat   Respiratory: no cough or shortness of breath   Cardiovascular: no chest pain or palpitations   Gastrointestinal: Positive for nausea with vomiting   Genitourinary: no hematuria or dysuria   Integument/Breast: no rash or pruritis   Hematologic/Lymphatic: no easy bruising or lymphadenopathy   Musculoskeletal: no arthralgias or myalgias.   Neurological: Positive for headaches  Behavioral/Psych: no auditory or visual hallucinations   Endocrine: no heat or cold intolerance       Objective:     Weight: 70.3 kg (155 lb)  Body mass index is 30.27 kg/m².  Vital Signs (Most Recent):  Temp: 98.5 °F (36.9 °C) (05/24/18 0956)  Pulse: 86 (05/24/18 1221)  Resp: 18 (05/24/18 1227)  BP: (!) 140/74 (05/24/18  1221)  SpO2: 100 % (18 1221) Vital Signs (24h Range):  Temp:  [98.5 °F (36.9 °C)] 98.5 °F (36.9 °C)  Pulse:  [72-86] 86  Resp:  [18-20] 18  SpO2:  [95 %-100 %] 100 %  BP: (140-190)/(68-91) 140/74        Neurosurgery Physical Exam   General: well developed, well nourished  Head: normocephalic, atraumatic  Neurologic: Somnolent, easily arouses to voice. Mild confusion.   GCS: Motor: 6/Verbal: 4/Eyes: 4 GCS Total: 14  Mental Status:Oriented to person and place (hospital) only. States she does not remember her  or current year.   Speech: No obvious dysarthria  Cranial nerves: face symmetric, tongue midline, CN II-XII grossly intact.   Eyes: pupils equal, round, reactive to light with accomodation, EOMI.   Pulmonary: normal respirations, no signs of respiratory distress  Abdomen: soft, non-distended, not tender to palpation  Sensory: response to light touch throughout  Motor Strength:Moves all extremities spontaneously with good tone and strength. No focal deficits. Full strength upper and lower extremities. No abnormal movements seen.   Pronator Drift: no drift noted  Finger-to-nose: unable to assess 2/2 mental status  Clonus: absent  Babinski: absent        Significant Labs:    Recent Labs  Lab 18  1011   *      K 3.9      CO2 23   BUN 15   CREATININE 0.7   CALCIUM 8.9       Recent Labs  Lab 18  1011   WBC 7.43   HGB 14.7   HCT 44.4          Recent Labs  Lab 18  1011   INR 1.0   APTT <21.0         Significant Diagnostics:  Head CT:  I independently reviewed the imaging.     Subarachnoid hemorrhage.    Abnormal round attenuating structure medial left temporal fossa region, possibly suprasellar region concerning for possible rim calcified aneurysm or extra-axial or less likely intra-axial mass, further evaluation with CTA, or MRI MRA advised.    Assessment/Plan:     Subarachnoid hemorrhage    67 year old female with PMHx of HTN, with an acute onset of severe frontal  headaches and N/V, found to have a SAH and probable rim calcified aneurysm on HCT.    -Admit to NCC  -Intubate for airway protection  -Maintain SBP < 140  -Placement of EVD to monitor ICP's  -Angio today with possible intervention vs crani for clipping  -Patient unable to sign consents due to mental status.  and daughter present at bedside. Severity of patient's condition discussed in detail with family. Informed family about the need for EVD placement and angio with intervention vs possible crani for clipping. Risks vs benefits of EVD placement, angio, and crani discussed with patient and family. All of their questions were answered. After conversation, family elected to proceed with EVD and anio vs crani. Family is also Portuguese speaking only. All conversations were had via [facility] in ED room.           Please call with any questions    YI Dove   [phone] (pager)  Neurosurgery  [facility]

## 2018-05-24 NOTE — ED NOTES
NEURO ICU AND NEUROSURGERY REMAINS AT BEDSIDE. PER MD ORDERS INTUBATION MEDICATIONS AND SET UP AT BEDSIDE, ARTERIAL LINE SET UP AT BEDSIDE, EVD SET UP AT BEDSIDE.

## 2018-05-24 NOTE — EICU
eLE for new admit. Dr WINIFRED Daniel and Dr Tucker @ bedside for intubation.  94 NSR, 117/57, 23, 100%.  Cardene infusing @ 2.5mg/hr. Bag/mask ventilation.  1423:  100mcg fentanyl ivp for sedation  1425:  91 NSR, 140/62, 18, 100%, bag/mask ventilation  1427:  14 mg etomidate ivp and 70mg rocuronium ivp prior to intubation.  101 ST, 117/56, 16, 100%, bag/mask ventilation  1430:  122 ST, 201/91, 24, 100%, intubated per Dr Daniel, size 8.0 ett, 20cm.  Cardene infusion titrated up to 5mg/hr.  Awaiting cxr for ett confirmation.  1435:  114 ST, 115/50, 20, 100% spo2, FiO2 100% per vent.  Scalp shaved/prepped by Dr Infante for EVD placement  1441:  86 NSR, 104/50, 20, 100%  1524:  EVD placed

## 2018-05-24 NOTE — SUBJECTIVE & OBJECTIVE
Past Medical History:   Diagnosis Date    Cataract     High cholesterol     Hyperlipemia     Hypertension      Past Surgical History:   Procedure Laterality Date    APPENDECTOMY       History reviewed. No pertinent family history.  Social History   Substance Use Topics    Smoking status: Never Smoker    Smokeless tobacco: Not on file    Alcohol use No     Review of patient's allergies indicates:  No Known Allergies    Medications: I have reviewed the current medication administration record.    Prescriptions Prior to Admission   Medication Sig Dispense Refill Last Dose    atenolol (TENORMIN) 100 MG tablet Take 100 mg by mouth once daily.   5/23/2018    atenolol (TENORMIN) 100 MG tablet Take 100 mg by mouth once daily.   5/23/2018    diazepam (VALIUM) 5 MG tablet Take 5 mg by mouth every 6 (six) hours as needed.   5/23/2018    ibuprofen (ADVIL,MOTRIN) 600 MG tablet Take 600 mg by mouth 3 (three) times daily.   5/23/2018    ondansetron (ZOFRAN) 4 MG tablet Take 1 tablet (4 mg total) by mouth every 6 (six) hours. 12 tablet 0 5/23/2018    oxycodone-acetaminophen  mg (PERCOCET)  mg per tablet Take 1 tablet by mouth every 4 (four) hours as needed.   5/23/2018       Review of Systems   Constitutional: Negative for unexpected weight change.   HENT: Negative for trouble swallowing and voice change.    Eyes: Positive for visual disturbance.   Respiratory: Negative for chest tightness.    Cardiovascular: Negative for chest pain.   Gastrointestinal: Positive for nausea and vomiting. Negative for abdominal pain and diarrhea.   Neurological: Positive for headaches. Negative for seizures and speech difficulty.   Psychiatric/Behavioral: Negative for agitation.     Objective:     Vital Signs (Most Recent):  Temp: 98.5 °F (36.9 °C) (05/24/18 0956)  Pulse: 109 (05/24/18 1435)  Resp: (!) 21 (05/24/18 1435)  BP: (!) 153/73 (05/24/18 1435)  SpO2: 100 % (05/24/18 1435)    Vital Signs Range (Last 24H):  Temp:   [98.5 °F (36.9 °C)]   Pulse:  []   Resp:  [18-28]   BP: (120-190)/(57-91)   SpO2:  [95 %-100 %]   Arterial Line BP: (153-157)/(58-61)     Physical Exam   Constitutional: She appears well-developed and well-nourished.   HENT:   Head: Normocephalic and atraumatic.   Eyes: Conjunctivae and EOM are normal. Pupils are equal, round, and reactive to light.   Neck: Normal range of motion. Neck supple.   Cardiovascular: Normal rate, regular rhythm and normal heart sounds.    Pulmonary/Chest: Effort normal and breath sounds normal.   Abdominal: Soft. Bowel sounds are normal.   Skin: Skin is warm.       Neurological Exam:   LOC: alert  Attention Span: poor  Language: No aphasia  Articulation: No dysarthria  Orientation: Not oriented to person, Not oriented to place, Not oriented to time  Visual Fields: Full  EOM (CN III, IV, VI): Full/intact  Pupils (CN II, III): PERRL  Facial Sensation (CN V): Corneal reflex present Bilateral  Facial Movement (CN VII): Unable to test   Gag Reflex: present  Reflexes: 2+ throughout  Motor: Moving all extremities   Cebellar: No evidence of appendicular or axial ataxia  Sensation: Unable to test   Tone: Normal tone throughout      Laboratory:  CMP:   Recent Labs  Lab 05/24/18  1011   CALCIUM 8.9   ALBUMIN 3.9   PROT 7.6      K 3.9   CO2 23      BUN 15   CREATININE 0.7   ALKPHOS 132   ALT 34   AST 38   BILITOT 0.7     CBC:   Recent Labs  Lab 05/24/18  1011   WBC 7.43   RBC 4.82   HGB 14.7   HCT 44.4      MCV 92   MCH 30.5   MCHC 33.1       Diagnostic Results:      Brain imaging:  CT Brain:  Subarachnoid hemorrhage.  Abnormal round attenuating structure medial left temporal fossa region, possibly suprasellar region concerning for possible rim calcified aneurysm or extra-axial or less likely intra-axial mass, further evaluation with CTA, or MRI MRA advised.    Vessel Imaging:  Angiogram pending     Cardiac Evaluation:   Pending

## 2018-05-24 NOTE — PROCEDURES
Radiology Post-Procedure Note    Pre Op Diagnosis: ruptured left ICA aneurysm    Post Op Diagnosis: Successful coiling of left pcom aneurysm    Procedure: Cerebral angiogram, coiling of pcom aneurysm    Procedure performed by: Dr. Mcgill, Dr. Gtuierrez    Written Informed Consent Obtained: Yes    Specimen Removed: NO    Estimated Blood Loss: Minimal    Procedure report:     A 6F sheath was placed into the right femoral artery and a 5F Jas catheter was advanced into the aortic arch.  The bilateral common carotid, bilateral internal carotid, and left vertebral arteries were subselected and angiography of the brain was performed after injection into each of these vessels.    There was a large ruptured left pcom aneurysm, also a small unruptured right pcom aneurysm.    The large left pcom aneurysm was successfully coiled.    Please see Imaging report for full details.    A right femoral artery angiogram was performed, the sheath removed and hemostasis achieved using Angioseal.  No hematoma was present at the time of hemostasis.    The patient tolerated the procedure well.     Patient started on aspirin, first dose given in angio suite.  This should be continued.    Benedicto Gutierrez MD  Neurointerventional Fellow  Department of Radiology  [phone]

## 2018-05-24 NOTE — ED PROVIDER NOTES
Encounter Date: 5/24/2018    SCRIBE #1 NOTE: I, Sony Jesús, am scribing for, and in the presence of, Sally Pandya MD. Other sections scribed: HPI, ROS, PE, EKG.       History     Chief Complaint   Patient presents with    Headache     Pt. arrives to ED via EMS, complaining of a headache that got unbearable around 40 minutes ago, pt. reports headache became so strong she began experiencing nausea and vomiting. Pt. has a history of HTN and has not taken medication today.  at bedside pt. in NAD    Nausea    Transfer     CC: Headache, Nausea  HPI: This 67 y.o. female with Hx of HTN presents to the ED via EMS c/o severe frontal HA radiating into the back of her head acute onset upon waking at 0900 this morning. Pt reports associated nausea and non-bloody emesis. EMS reports last BP /90, vitals otherwise normal; pt denies taking her blood pressure medication yet today. She denies any Hx of similar Sx; she does not take blood thinners. She denies chest pain, SOB, diarrhea, abdominal pain.      Discussed case further with . He explains that pt woke up at 0530 feeling her normal self and continued daily activities until sudden onset of Sx at 0900.      The history is provided by the patient, the EMS personnel and the spouse. The history is limited by a language barrier (Nigerian). A  was used (Sally Pandya MD).     Review of patient's allergies indicates:  No Known Allergies  Past Medical History:   Diagnosis Date    Cataract     High cholesterol     Hyperlipemia     Hypertension      Past Surgical History:   Procedure Laterality Date    APPENDECTOMY       History reviewed. No pertinent family history.  Social History   Substance Use Topics    Smoking status: Never Smoker    Smokeless tobacco: Not on file    Alcohol use No     Review of Systems   Constitutional: Negative for fever.   HENT: Negative for congestion and rhinorrhea.    Eyes: Negative for discharge and  redness.   Respiratory: Negative for cough and shortness of breath.    Cardiovascular: Negative for chest pain.   Gastrointestinal: Positive for nausea and vomiting. Negative for abdominal pain and diarrhea.   Genitourinary: Negative for difficulty urinating and dysuria.   Musculoskeletal: Negative for arthralgias and myalgias.   Skin: Negative for rash.   Neurological: Positive for headaches. Negative for syncope, facial asymmetry, speech difficulty and numbness.       Physical Exam     Initial Vitals [05/24/18 0956]   BP Pulse Resp Temp SpO2   (!) 190/91 76 18 98.5 °F (36.9 °C) 98 %      MAP       124         Physical Exam    Nursing note and vitals reviewed.  Constitutional: She appears well-developed and well-nourished.   HENT:   Head: Normocephalic and atraumatic.   Neck: Normal range of motion.   Cardiovascular: Normal rate and regular rhythm.   Pulmonary/Chest: Breath sounds normal. No respiratory distress.   Abdominal: Soft. There is no tenderness.   Musculoskeletal: Normal range of motion. She exhibits no edema.   Neurological: She is alert and oriented to person, place, and time. She has normal strength. No cranial nerve deficit or sensory deficit.   No focal deficits. Speech normal.   Skin: Skin is warm and dry.   Psychiatric: She has a normal mood and affect. Thought content normal.         ED Course   Critical Care  Date/Time: 5/24/2018 11:07 AM  Performed by: JULIA OCHOA  Authorized by: JULIA OCHOA   Direct patient critical care time: 60 minutes  Total critical care time (exclusive of procedural time) : 60 minutes  Critical care was necessary to treat or prevent imminent or life-threatening deterioration of the following conditions: CNS failure or compromise.  Critical care was time spent personally by me on the following activities: discussions with consultants, ordering and review of laboratory studies, obtaining history from patient or surrogate, evaluation of patient's response to  treatment, review of old charts, re-evaluation of patient's condition, ordering and review of radiographic studies, ordering and performing treatments and interventions and examination of patient.        Labs Reviewed   CBC W/ AUTO DIFFERENTIAL - Abnormal; Notable for the following:        Result Value    Mono% 3.5 (*)     All other components within normal limits   COMPREHENSIVE METABOLIC PANEL - Abnormal; Notable for the following:     Glucose 162 (*)     All other components within normal limits   LIPID PANEL - Abnormal; Notable for the following:     HDL 31 (*)     HDL/Chol Ratio 16.8 (*)     Total Cholesterol/HDL Ratio 6.0 (*)     All other components within normal limits   TSH - Abnormal; Notable for the following:     TSH 8.928 (*)     All other components within normal limits   TROPONIN I   PROTIME-INR   APTT   APTT   PROTIME-INR   HEMOGLOBIN A1C   LIPID PANEL   TSH   TOXICOLOGY SCREEN, URINE, RANDOM (COMPLIANCE)   TYPE AND SCREEN PREOP   POCT GLUCOSE MONITORING CONTINUOUS     EKG Readings: (Independently Interpreted)   Rhythm: Normal Sinus Rhythm. Heart Rate: 85. T Waves Flipped: II, III, V4, V5, V6, AVF and V3. Clinical Impression: Normal Sinus Rhythm          Medical Decision Making:   History:   Old Medical Records: I decided to obtain old medical records.  Old Records Summarized: records from another hospital.  1038: spoke with radiology. Subarachnoid hemorrhage and a rounded structure concerning for aneurysm, mass, or metastasis found on CT; recommends CTA. Will put in call to neurosurgery and transfer center. Pt's /70; she wants more medication for her pain.    1048: Spoke with Dr. Gregg. He believes pt has a ruptured aneurysm. He wants pt's BP >140 systolic; I will start pt on Nicardipine drip and have CTA done at [facility] as recommended. Pt reports improvement of Sx after Fentanyl and Zofran given.             Scribe Attestation:   Scribe #1: I performed the above scribed service and the  documentation accurately describes the services I performed. I attest to the accuracy of the note.    Attending Attestation:   Physician Attestation Statement for Resident:  As the supervising MD . -: Patient arrived at 10:00 a.m..  Patient with headache and vomiting.  Patient went to CAT scan stat.  Patient noted to have subarachnoid hemorrhage.  I spoke with radiology.  I spoke with neurosurgery Dr. plummer to accept this patient.  He would like neck Cardene drip with systolic blood pressure less than 140.  After Compazine fentanyl and Zofran patient's symptoms are improving.  She is maintaining her own airway.She will be transferred to [facility] for CTA and intervention for possible aneurysm    PATIENT ON NICARDIPINE DRIP AT TRANSFER SYSTOLIC 130S.  PATIENT IS SLEEPY BUT AROUSABLE.  MAINTAINING HER OWN AIRWAY.  NO GROSS CHANGE IN NEUROLOGICAL EXAM.  I SPOKE AT LENGTH WITH  AND DAUGHTER.         Physician Attestation for Scribe:  Physician Attestation Statement for Scribe #1: I, Sally Pandya MD, reviewed documentation, as scribed by Sony Espinosa in my presence, and it is both accurate and complete.                    Clinical Impression:   The primary encounter diagnosis was Subarachnoid hemorrhage. Diagnoses of Chest pain, Stroke, Primary thunderclap headache, Essential hypertension, and Cerebral aneurysm rupture were also pertinent to this visit.       I, Dr. Keisha Cordova, personally performed the services described in this documentation. All medical record entries made by the scribe were at my direction and in my presence.  I have reviewed the chart and agree that the record reflects my personal performance and is accurate and complete. Keisha Cordova MD.  2:13 PM 05/25/2018                        Sally Pandya MD  06/12/18 0924

## 2018-05-24 NOTE — PLAN OF CARE
Problem: Patient Care Overview  Goal: Plan of Care Review  Outcome: Ongoing (interventions implemented as appropriate)  POC reviewed with pt and family at 1600. Pt is intubated; family verbalized understanding. Pt was intubated upon arrival and EVD placed at bedside at 20 cmH2O. Will continue to monitor. See flowsheets for full assessment and VS info.

## 2018-05-24 NOTE — H&P
Inpatient Radiology Pre-procedure Note    History of Present Illness:  Kathie Lau is a 67 y.o. female with headache and nausea since last night presented to  with severe frontal headache upon waking this morning.  Pt found to have SAH and large calcified left distal ICA aneurysm on NCCT.      She was transferred to [facility], intubated and EVD placed.    Admission H&P reviewed.  Past Medical History:   Diagnosis Date    Cataract     High cholesterol     Hyperlipemia     Hypertension      Past Surgical History:   Procedure Laterality Date    APPENDECTOMY         Review of Systems:   As documented in primary team H&P    Home Meds:   Prior to Admission medications    Medication Sig Start Date End Date Taking? Authorizing Provider   atenolol (TENORMIN) 100 MG tablet Take 100 mg by mouth once daily.   Yes Historical Provider, MD   atenolol (TENORMIN) 100 MG tablet Take 100 mg by mouth once daily.   Yes Historical Provider, MD   diazepam (VALIUM) 5 MG tablet Take 5 mg by mouth every 6 (six) hours as needed.   Yes Historical Provider, MD   ibuprofen (ADVIL,MOTRIN) 600 MG tablet Take 600 mg by mouth 3 (three) times daily.   Yes Historical Provider, MD   ondansetron (ZOFRAN) 4 MG tablet Take 1 tablet (4 mg total) by mouth every 6 (six) hours. 7/21/13  Yes Fabiano Cartwright MD   oxycodone-acetaminophen  mg (PERCOCET)  mg per tablet Take 1 tablet by mouth every 4 (four) hours as needed.   Yes Historical Provider, MD     Scheduled Meds:    cefTRIAXone (ROCEPHIN) IVPB  2 g Intravenous Once    etomidate        fentaNYL        levetiracetam IVPB  500 mg Intravenous Q12H    lidocaine-EPINEPHrine 1%-1:100,000  20 mL Other Once    niMODipine  60 mg Oral Q4H    rocuronium        rocuronium        senna-docusate 8.6-50 mg  1 tablet Oral BID    sodium chloride 0.9%  3 mL Intravenous Q8H     Continuous Infusions:    niCARdipine 2.5 mg/hr (05/24/18 1330)     PRN Meds:hydrALAZINE,  labetalol  Anticoagulants/Antiplatelets: no anticoagulation    Allergies: Review of patient's allergies indicates:  No Known Allergies  Sedation Hx: have not been any systemic reactions    Labs:    Recent Labs  Lab 05/24/18  1011   INR 1.0       Recent Labs  Lab 05/24/18  1011   WBC 7.43   HGB 14.7   HCT 44.4   MCV 92         Recent Labs  Lab 05/24/18  1011   *      K 3.9      CO2 23   BUN 15   CREATININE 0.7   CALCIUM 8.9   ALT 34   AST 38   ALBUMIN 3.9   BILITOT 0.7         Vitals:  Temp: 98.5 °F (36.9 °C) (05/24/18 0956)  Pulse: 90 (05/24/18 1340)  Resp: (!) 24 (05/24/18 1340)  BP: 134/65 (05/24/18 1340)  SpO2: 97 % (05/24/18 1340)     Physical Exam:  ASA: 4  Intubated    Assessment / Plan:  67 year old female with SAH presumably 2/2 ruptured distal left ICA aneurysm.    Cerebral angiogram possible endovascular treatment.    Sedation Plan: General    Benedicto Gutierrez MD  Neurointerventional Fellow  Department of Radiology  [phone]

## 2018-05-24 NOTE — PROGRESS NOTES
Pt given 70mg of Rocuronium; 30 mg of etomidate; 100 mcg of Fentanyl for intubation. VSS. Will continue to closely monitor.

## 2018-05-24 NOTE — HPI
is a 67 y.o. female with a PMHx of HTN, who presents to the [facility] ED as a transfer from [facility] due to SAH and rim calcified aneurysm. She originally presented to the OSH with complaints of severe frontal HA's that radiated into the back of her head. These headaches, along with nausea and emesis, began around 09:00 this morning. She denies any known family history of aneurysms. Denies taking any blood thinners.     Of note, patient is Setswana speaking only. History obtained via [employer].

## 2018-05-24 NOTE — ED TRIAGE NOTES
Pt arrived via ems with c/o nausea and headache since last night; pt denies any other symptoms at this time; pt's  at the bedside; pt has hx of hypertension

## 2018-05-24 NOTE — ASSESSMENT & PLAN NOTE
67 year old female with PMHx of HTN, with an acute onset of severe frontal headaches and N/V, found to have a SAH and probable rim calcified aneurysm on HCT.    -Admit to NCC  -Intubate for airway protection  -Maintain SBP < 140  -Placement of EVD to monitor ICP's  -Angio today with possible intervention vs crani for clipping  -Patient unable to sign consents due to mental status.  and daughter present at bedside. Severity of patient's condition discussed in detail with family. Informed family about the need for EVD placement and angio with intervention vs possible crani for clipping. Risks vs benefits of EVD placement, angio, and crani discussed with patient and family. All of their questions were answered. After conversation, family elected to proceed with EVD and anio vs crani. Family is also Indonesian speaking only. All conversations were had via [employer] in ED room.

## 2018-05-24 NOTE — ASSESSMENT & PLAN NOTE
68 yo F with HTN and HLD who is transferred to [facility] from [facility] with subarachnoid hemorrhage noted on CT Head. Likely due to ruptured large aneurysm    Antithrombotics for secondary stroke prevention: Antiplatelets: None: SAH    Statins for secondary stroke prevention and hyperlipidemia, if present:   Statins: Atorvastatin- 40 mg daily    Aggressive risk factor modification: HTN, DM     Rehab efforts: Occupational Therapy, PT/OT/SLP to evaluate and treat    Diagnostics ordered/pending: HgbA1C to assess blood glucose levels, Lipid Profile to assess cholesterol levels, TTE to assess cardiac function/status , TSH to assess thyroid function    VTE prophylaxis: None: Reason for No Pharmacological VTE Prophylaxis: History of systemic or intracranial bleeding    BP parameters: SAH: Unsecured aneurysm, SBP <140    - Neurosurgery consulted    - Plan for Angio today with possible intervention vs crani for clipping    - q1h neuro checks    - Keppra 500 mg IV q12h    - Nimodipine 60 mg  q4h    Case discussed with Dr Mireles

## 2018-05-24 NOTE — PROCEDURES
Kathie Lau is a 67 y.o. female patient.    Temp: 98.5 °F (36.9 °C) (05/24/18 0956)  Pulse: 109 (05/24/18 1435)  Resp: (!) 21 (05/24/18 1435)  BP: (!) 153/73 (05/24/18 1435)  SpO2: 100 % (05/24/18 1435)  Weight: 70.3 kg (155 lb) (05/24/18 0956)  Height: 5' (152.4 cm) (05/24/18 0956)       Intubation  Date/Time: 5/24/2018 3:13 PM  Location procedure was performed: [facility] NEURO [facility]  Performed by: DUNCAN MOBELY.  Authorized by: DUNCAN MOBLEY   Pre-operative diagnosis: SAH  Post-operative diagnosis: SAH  Consent Done: Yes  Consent: Verbal consent obtained. Written consent obtained.  Risks and benefits: risks, benefits and alternatives were discussed  Consent given by: spouse  Required items: required blood products, implants, devices, and special equipment available  Indications: airway protection  Intubation method: direct  Patient status: paralyzed (RSI)  Preoxygenation: BVM  Pretreatment medications: fentanyl  Sedatives: etomidate  Paralytic: rocuronium  Laryngoscope size: Mac 3  Tube size: 8.0 mm  Tube type: cuffed  Number of attempts: 1  Cricoid pressure: yes  Cords visualized: yes  Post-procedure assessment: chest rise and CO2 detector  Breath sounds: clear  Cuff inflated: yes  ETT to lip: 20 cm  Tube secured with: ETT pemberton  Patient tolerance: Patient tolerated the procedure well with no immediate complications  Complications: No          Duncan Mobley  5/24/2018

## 2018-05-24 NOTE — PT/OT/SLP PROGRESS
Speech Language Pathology  Discharge Summary      Kathie Lau  MRN: [id number]    Patient not seen today secondary to pt intubated. Will need new orders when/if appropriate.    Dorothy Spence M.S., CCC-SLP  Speech Language Pathologist  [phone] - pager   5/24/2018  .    Dorothy Spence, MS CCC-SLP

## 2018-05-24 NOTE — ED NOTES
ATTEMPT TO PLACE ARTERIAL LINE TO LEFT RADIAL ARTERY UNSUCCESSFUL. DR YEH AT BEDSIDE AND WILL PLACE RIGHT RADIAL ART LINE.

## 2018-05-24 NOTE — ED PROVIDER NOTES
Encounter Date: 5/24/2018    SCRIBE #1 NOTE: I, Sharon Jimenez, am scribing for, and in the presence of,  Dr. Cordova. I have scribed the following portions of the note - Other sections scribed: HPI, ROS, PE .       History     Chief Complaint   Patient presents with    Headache     Pt. arrives to ED via EMS, complaining of a headache that got unbearable around 40 minutes ago, pt. reports headache became so strong she began experiencing nausea and vomiting. Pt. has a history of HTN and has not taken medication today.  at bedside pt. in NAD    Nausea    Transfer     Time patient was seen by the provider: 12:19 PM      The patient is a 67 y.o. female with co-morbidities including: HTN and HLD who presents to the ED via transfer from [facility] where she was evaluated two hours prior from sudden onset HA that woke her from sleep with associated nausea and vomiting. Patient received a CT head that was consistent with SAH. She was started on a Cardene drip which was used for blood pressure management and transferred here.       The history is provided by a relative and medical records. The history is limited by a language barrier and the condition of the patient.     Review of patient's allergies indicates:  No Known Allergies  Past Medical History:   Diagnosis Date    Cataract     High cholesterol     Hyperlipemia     Hypertension      Past Surgical History:   Procedure Laterality Date    APPENDECTOMY       History reviewed. No pertinent family history.  Social History   Substance Use Topics    Smoking status: Never Smoker    Smokeless tobacco: Not on file    Alcohol use No     Review of Systems   Unable to perform ROS: Mental status change   Gastrointestinal: Positive for nausea and vomiting.   Neurological: Positive for headaches.       Physical Exam     Initial Vitals [05/24/18 0956]   BP Pulse Resp Temp SpO2   (!) 190/91 76 18 98.5 °F (36.9 °C) 98 %      MAP       124         Physical  Exam    Nursing note and vitals reviewed.  Constitutional:   Patient is ill-appearing   HENT:   Mucous membranes moist.    Eyes: Pupils are equal, round, and reactive to light.   Pupils 3 mm bilaterally.    Neck: Neck supple.   Cardiovascular: Normal rate and regular rhythm.   No murmur heard.  Pulmonary/Chest: Breath sounds normal. No respiratory distress.   Abdominal: Soft. There is no tenderness.   Neurological: She is alert.   Confused. Patient knows name, but doesn't know . Also doesn't know date or time. Able to follow commands. No focal weaknesses.    Skin: No rash noted.         ED Course   Procedures  Labs Reviewed   CBC W/ AUTO DIFFERENTIAL - Abnormal; Notable for the following:        Result Value    Mono% 3.5 (*)     All other components within normal limits   COMPREHENSIVE METABOLIC PANEL - Abnormal; Notable for the following:     Glucose 162 (*)     All other components within normal limits   LIPID PANEL - Abnormal; Notable for the following:     HDL 31 (*)     HDL/Chol Ratio 16.8 (*)     Total Cholesterol/HDL Ratio 6.0 (*)     All other components within normal limits   TSH - Abnormal; Notable for the following:     TSH 8.928 (*)     All other components within normal limits   TROPONIN I   PROTIME-INR   APTT   APTT   PROTIME-INR   HEMOGLOBIN A1C   LIPID PANEL   TSH   TOXICOLOGY SCREEN, URINE, RANDOM (COMPLIANCE)   TYPE AND SCREEN PREOP   POCT GLUCOSE MONITORING CONTINUOUS             Medical Decision Making:   History:   Old Medical Records: I decided to obtain old medical records.  Initial Assessment:   66 yo f, h/o HTN, transfer for SAH  Confused with GCS 14  VSS, on cardene drip    NSG and neuro ICU at bedside on arrival  Plans for intubation, EVD in neuro ICU  Angiogram following  Family at bedside, updated on plan  Other:   I have discussed this case with another health care provider.            Scribe Attestation:   Scribe #1: I performed the above scribed service and the documentation  accurately describes the services I performed. I attest to the accuracy of the note.               Clinical Impression:   The primary encounter diagnosis was Subarachnoid hemorrhage. Diagnoses of Chest pain, Stroke, Primary thunderclap headache, Essential hypertension, and Cerebral aneurysm rupture were also pertinent to this visit.    Disposition:   Disposition: Admitted    I, Dr. Keisha Cordova, personally performed the services described in this documentation. All medical record entries made by the scribe were at my direction and in my presence.  I have reviewed the chart and agree that the record reflects my personal performance and is accurate and complete. Keisha Cordova MD.  8:48 AM 05/25/2018                        Keisha Cordova MD  05/25/18 0848

## 2018-05-24 NOTE — ED TRIAGE NOTES
Pt. arrives to ED via [facility] from [facility], complaining of a headache that started around 9am. Pt experienced nausea/vomiting. Pt was dx with subarachnoid at transferred for neuro.

## 2018-05-24 NOTE — H&P
[facility]  Neurocritical Care  History & Physical    Admit Date: 5/24/2018  Service Date: 05/24/2018  Length of Stay: 0    Subjective:     Chief Complaint: Subarachnoid hemorrhage    History of Present Illness: This history was taken with the aid of a .     68 yo F with HTN and HLD who is transferred to [facility] from [facility] with subarachnoid hemorrhage noted on CT Head.  Pt initially presented to the [facility] ED this morning with complaints of acute onset of severe headache, nausea, and vomiting that started this morning at 0900 while in the shower. She denies any trauma. EMS reported /90 prior to admission, vitals otherwise normal. Pt denies taking her blood pressure medication yet today. She denies taking any blood thinners or aspirin. No prior stroke history.  She denies chest pain, SOB, diarrhea, abdominal pain.      Past Medical History:   Diagnosis Date    Cataract     High cholesterol     Hyperlipemia     Hypertension      Past Surgical History:   Procedure Laterality Date    APPENDECTOMY        No current facility-administered medications on file prior to encounter.      Current Outpatient Prescriptions on File Prior to Encounter   Medication Sig Dispense Refill    atenolol (TENORMIN) 100 MG tablet Take 100 mg by mouth once daily.      atenolol (TENORMIN) 100 MG tablet Take 100 mg by mouth once daily.      diazepam (VALIUM) 5 MG tablet Take 5 mg by mouth every 6 (six) hours as needed.      ibuprofen (ADVIL,MOTRIN) 600 MG tablet Take 600 mg by mouth 3 (three) times daily.      ondansetron (ZOFRAN) 4 MG tablet Take 1 tablet (4 mg total) by mouth every 6 (six) hours. 12 tablet 0    oxycodone-acetaminophen  mg (PERCOCET)  mg per tablet Take 1 tablet by mouth every 4 (four) hours as needed.        Allergies: Patient has no known allergies.    History reviewed. No pertinent family history.  Social History   Substance Use Topics    Smoking  status: Never Smoker    Smokeless tobacco: Not on file    Alcohol use No     Review of Systems   Constitutional: Negative for chills and fever.   HENT: Negative for drooling and voice change.    Eyes: Positive for visual disturbance (blurry). Negative for photophobia, pain and redness.   Respiratory: Negative for cough and shortness of breath.    Cardiovascular: Negative for chest pain and palpitations.   Gastrointestinal: Positive for nausea and vomiting.   Genitourinary: Negative for difficulty urinating and dysuria.   Musculoskeletal: Negative for neck pain and neck stiffness.   Skin: Negative for rash and wound.   Neurological: Positive for headaches. Negative for seizures, syncope, facial asymmetry, speech difficulty, weakness and numbness.     Objective:     Vitals:    Temp: 98.5 °F (36.9 °C)  Pulse: 90  BP: 134/65  MAP (mmHg): 93  Resp: (!) 24  SpO2: 97 %  O2 Device (Oxygen Therapy): room air    Temp  Min: 98.5 °F (36.9 °C)  Max: 98.5 °F (36.9 °C)  Pulse  Min: 72  Max: 91  BP  Min: 131/63  Max: 190/91  MAP (mmHg)  Min: 90  Max: 112  Resp  Min: 18  Max: 28  SpO2  Min: 95 %  Max: 100 %    No intake/output data recorded.           Physical Exam   Constitutional: She appears well-developed and well-nourished.   HENT:   Head: Normocephalic and atraumatic.   Eyes: EOM are normal. Pupils are equal, round, and reactive to light.   Neck: Normal range of motion. Neck supple.   Cardiovascular: Normal rate and regular rhythm.    Pulmonary/Chest: Effort normal and breath sounds normal. No respiratory distress.   Abdominal: Soft. She exhibits no distension. There is no tenderness.   Musculoskeletal: Normal range of motion. She exhibits no edema, tenderness or deformity.   Neurological: She is alert. She has normal strength. No cranial nerve deficit or sensory deficit. She displays no seizure activity. GCS eye subscore is 4. GCS verbal subscore is 4. GCS motor subscore is 6.   Drowsy but easily aroused.   A&Ox2, oriented  to self and place only.   Skin: Skin is warm and dry.       Today I personally reviewed pertinent medications, lines/drains/airways, imaging, cardiology, lab results, microbiology results, notably:        Assessment/Plan:     Neuro   * Subarachnoid hemorrhage    - SAH with probably rim calcified aneurysm noted on CT   - Neurosurgery consulted  - Vascular Neurology consulted  - Intubate for airway protection  - Goal SBP < 140. Cardene gtt.   - Plan for placement of EVD to monitor ICP's  - Plan for Angio today with possible intervention vs crani for clipping  - q1h neuro checks  - Keppra 500 mg IV q12h  - Nimodipine 60 mg  q4h          Cerebral aneurysm rupture    - see SAH        Primary thunderclap headache    - see SAH        Cardiac/Vascular   Essential hypertension    - Cardene gtt  - Goal SBP <140            Prophylaxis:  Venous Thromboembolism: mechanical  Stress Ulcer: H2B  Ventilator Pneumonia: no     Activity Orders          None        Full Code    Shelby Andre MD  Neurocritical Care  [facility]

## 2018-05-24 NOTE — CONSULTS
Inpatient consult to Physical Medicine Rehab  Consult performed by: ANH HURLEY  Consult ordered by: TARI GREEN  Reason for consult: assess rehab needs  Assessment/Recommendations: Recommend early mobility, OOB in chair, and PT/OT/SLP when appropriate.      Patient intubated, sedated, and requiring pressor support.  She is too acute at this time; rehab potential cannot be appropriately assessed.  Will sign off.  Please re-consult when patient is medically stable and able to participate with therapy.    Thank you for consult.    PADMINI Norris, FNP-C  Physical Medicine & Rehabilitation   05/25/2018  Spectralink: [id number]

## 2018-05-24 NOTE — ASSESSMENT & PLAN NOTE
- SAH with probably rim calcified aneurysm noted on CT   - Neurosurgery consulted  - Vascular Neurology consulted  - Intubate for airway protection  - Goal SBP < 140. Cardene gtt.   - Plan for placement of EVD to monitor ICP's  - Plan for Angio today with possible intervention vs crani for clipping  - q1h neuro checks  - Keppra 500 mg IV q12h  - Nimodipine 60 mg  q4h

## 2018-05-24 NOTE — HOSPITAL COURSE
5/24: Admitted to Neuro ICU. BP control with cardene gtt. Plan for intubation, EVD placement, angio, and possible crani and coiling.   5/25: acute worsening in exam overnight found to re-bleed; family meeting held, no acute surgical intervention

## 2018-05-24 NOTE — CONSULTS
[facility]  Vascular Neurology  Comprehensive Stroke Center  Consult Note    Inpatient consult to Vascular (Stroke) Neurology  Consult performed by: FELIX PATRICIA II  Consult ordered by: TARI GREEN  Reason for consult: SAH        Assessment/Plan:       * Subarachnoid hemorrhage    66 yo F with HTN and HLD who is transferred to [facility] from [facility] with subarachnoid hemorrhage noted on CT Head. Likely due to ruptured large aneurysm    Antithrombotics for secondary stroke prevention: Antiplatelets: None: SAH    Statins for secondary stroke prevention and hyperlipidemia, if present:   Statins: Atorvastatin- 40 mg daily    Aggressive risk factor modification: HTN, DM     Rehab efforts: Occupational Therapy, PT/OT/SLP to evaluate and treat    Diagnostics ordered/pending: HgbA1C to assess blood glucose levels, Lipid Profile to assess cholesterol levels, TTE to assess cardiac function/status , TSH to assess thyroid function    VTE prophylaxis: None: Reason for No Pharmacological VTE Prophylaxis: History of systemic or intracranial bleeding    BP parameters: SAH: Unsecured aneurysm, SBP <140    - Neurosurgery consulted    - Plan for Angio today with possible intervention vs crani for clipping    - q1h neuro checks    - Keppra 500 mg IV q12h    - Nimodipine 60 mg  q4h    Case discussed with Dr Mireles             STROKE DOCUMENTATION          NIH Scale:  Interval: baseline (upon arrival/admit)  1a. Level Of Consciousness: 0-->Alert: keenly responsive  1b. LOC Questions: 2-->Answers neither question correctly  1c. LOC Commands: 1-->Performs one task correctly  2. Best Gaze: 0-->Normal (UNable to perform patient not coperating)  3. Visual: 0-->No visual loss  4. Facial Palsy: 0-->Normal symmetrical movements  5a. Motor Arm, Left: 0-->No drift: limb holds 90 (or 45) degrees for full 10 secs  5b. Motor Arm, Right: 0-->No drift: limb holds 90 (or 45) degrees for full 10 secs  6a. Motor Leg, Left:  0-->No drift: leg holds 30 degree position for full 5 secs  6b. Motor Leg, Right: 0-->No drift: leg holds 30 degree position for full 5 secs  7. Limb Ataxia: 0-->Absent  8. Sensory: 0-->Normal: no sensory loss  9. Best Language: 0-->No aphasia: normal  10. Dysarthria: 1-->Mild-to-moderate dysarthria: patient slurs at least some words and, at worst, can be understood with some difficulty  11. Extinction and Inattention (formerly Neglect): 0-->No abnormality  Total (NIH Stroke Scale): 4    Modified Radha Score: 1  Kiser & Wiggins Classification:Grade II      Thrombolysis Candidate? No, CT findings (ICH, SAH, etc)       Interventional Revascularization Candidate?   Is the patient eligible for mechanical endovascular reperfusion (SARIAH)?  No; No significant neurological deficit      Hemorrhagic change of an Ischemic Stroke: Does this patient have an ischemic stroke with hemorrhagic changes? No     Subjective:     History of Present Illness:  History from chart. Patient language barrier. Patient is about to leave for angiogram.    68 yo F with HTN and HLD who is transferred to [facility] from [facility] with subarachnoid hemorrhage noted on CT Head.  Pt initially presented to the [facility] ED this morning with complaints of acute onset of severe headache, nausea, and vomiting that started this morning at 0900 while in the shower. She denies any trauma. EMS reported /90 prior to admission, vitals otherwise normal. Pt denies taking her blood pressure medication yet today. She denies taking any blood thinners or aspirin. No prior stroke history.  She denies chest pain, SOB, diarrhea, abdominal pain.         Past Medical History:   Diagnosis Date    Cataract     High cholesterol     Hyperlipemia     Hypertension      Past Surgical History:   Procedure Laterality Date    APPENDECTOMY       History reviewed. No pertinent family history.  Social History   Substance Use Topics    Smoking status: Never Smoker     Smokeless tobacco: Not on file    Alcohol use No     Review of patient's allergies indicates:  No Known Allergies    Medications: I have reviewed the current medication administration record.    Prescriptions Prior to Admission   Medication Sig Dispense Refill Last Dose    atenolol (TENORMIN) 100 MG tablet Take 100 mg by mouth once daily.   5/23/2018    atenolol (TENORMIN) 100 MG tablet Take 100 mg by mouth once daily.   5/23/2018    diazepam (VALIUM) 5 MG tablet Take 5 mg by mouth every 6 (six) hours as needed.   5/23/2018    ibuprofen (ADVIL,MOTRIN) 600 MG tablet Take 600 mg by mouth 3 (three) times daily.   5/23/2018    ondansetron (ZOFRAN) 4 MG tablet Take 1 tablet (4 mg total) by mouth every 6 (six) hours. 12 tablet 0 5/23/2018    oxycodone-acetaminophen  mg (PERCOCET)  mg per tablet Take 1 tablet by mouth every 4 (four) hours as needed.   5/23/2018       Review of Systems   Constitutional: Negative for unexpected weight change.   HENT: Negative for trouble swallowing and voice change.    Eyes: Positive for visual disturbance.   Respiratory: Negative for chest tightness.    Cardiovascular: Negative for chest pain.   Gastrointestinal: Positive for nausea and vomiting. Negative for abdominal pain and diarrhea.   Neurological: Positive for headaches. Negative for seizures and speech difficulty.   Psychiatric/Behavioral: Negative for agitation.     Objective:     Vital Signs (Most Recent):  Temp: 98.5 °F (36.9 °C) (05/24/18 0956)  Pulse: 109 (05/24/18 1435)  Resp: (!) 21 (05/24/18 1435)  BP: (!) 153/73 (05/24/18 1435)  SpO2: 100 % (05/24/18 1435)    Vital Signs Range (Last 24H):  Temp:  [98.5 °F (36.9 °C)]   Pulse:  []   Resp:  [18-28]   BP: (120-190)/(57-91)   SpO2:  [95 %-100 %]   Arterial Line BP: (153-157)/(58-61)     Physical Exam   Constitutional: She appears well-developed and well-nourished.   HENT:   Head: Normocephalic and atraumatic.   Eyes: Conjunctivae and EOM are normal.  Pupils are equal, round, and reactive to light.   Neck: Normal range of motion. Neck supple.   Cardiovascular: Normal rate, regular rhythm and normal heart sounds.    Pulmonary/Chest: Effort normal and breath sounds normal.   Abdominal: Soft. Bowel sounds are normal.   Skin: Skin is warm.       Neurological Exam:   LOC: alert  Attention Span: poor  Language: No aphasia  Articulation: No dysarthria  Orientation: Not oriented to person, Not oriented to place, Not oriented to time  Visual Fields: Full  EOM (CN III, IV, VI): Full/intact  Pupils (CN II, III): PERRL  Facial Sensation (CN V): Corneal reflex present Bilateral  Facial Movement (CN VII): Unable to test   Gag Reflex: present  Reflexes: 2+ throughout  Motor: Moving all extremities   Cebellar: No evidence of appendicular or axial ataxia  Sensation: Unable to test   Tone: Normal tone throughout      Laboratory:  CMP:   Recent Labs  Lab 05/24/18  1011   CALCIUM 8.9   ALBUMIN 3.9   PROT 7.6      K 3.9   CO2 23      BUN 15   CREATININE 0.7   ALKPHOS 132   ALT 34   AST 38   BILITOT 0.7     CBC:   Recent Labs  Lab 05/24/18  1011   WBC 7.43   RBC 4.82   HGB 14.7   HCT 44.4      MCV 92   MCH 30.5   MCHC 33.1       Diagnostic Results:      Brain imaging:  CT Brain:  Subarachnoid hemorrhage.  Abnormal round attenuating structure medial left temporal fossa region, possibly suprasellar region concerning for possible rim calcified aneurysm or extra-axial or less likely intra-axial mass, further evaluation with CTA, or MRI MRA advised.    Vessel Imaging:  Angiogram pending     Cardiac Evaluation:   Pending       Madie Herrera II, MD  Comprehensive Stroke Center  Department of Vascular Neurology   [facility]

## 2018-05-24 NOTE — HPI
This history was taken with the aid of a .     68 yo F with HTN and HLD who is [place] [facility] [place] [facility] with subarachnoid hemorrhage noted on CT Head.  Pt initially presented to the [facility] ED this morning with complaints of acute onset of severe headache, nausea, and vomiting that started this morning at 0900 while in the shower. She denies any trauma. EMS reported /90 prior to admission, vitals otherwise normal. Pt denies taking her blood pressure medication yet today. She denies taking any blood thinners or aspirin. No prior stroke history.  She denies chest pain, SOB, diarrhea, abdominal pain.

## 2018-05-24 NOTE — ED NOTES
INTUBATION, ARTERIAL LINE PLACEMENT, EVD PLACEMENT, ANGIOGRAM CATHETER PROCEDURE, AND BLOOD PRODUCTS DISCUSSED WITH PT AND PT'S FAMILY MEMBERS BY NEURO ICU AND NEUROSURGERY TEAM AT BEDSIDE THROUGH KYLE ( MAGNOLIA #[id number] - ALL CONSENTS SIGNED BY PT'S , MD, AND HOSPITAL WITNESS. BLOOD TRANSFUSION , ANGIOGRAM, AND EVD CONSENT LEFT AT BEDSIDE - ALL OTHER CONSENTS WITH NEUROSURGERY TEAM.

## 2018-05-24 NOTE — HOSPITAL COURSE
5/24: Admit to NCC. Intubate and place EVD. Angio with possible intervention vs crani. Increasing SAH before and after coiling.

## 2018-05-25 PROBLEM — G93.5 BRAIN COMPRESSION: Status: ACTIVE | Noted: 2018-01-01

## 2018-05-25 PROBLEM — R40.2430 GLASGOW COMA SCALE TOTAL SCORE 3-8: Status: ACTIVE | Noted: 2018-01-01

## 2018-05-25 PROBLEM — H57.9: Status: ACTIVE | Noted: 2018-01-01

## 2018-05-25 NOTE — SUBJECTIVE & OBJECTIVE
Interval History:  Acute worsening in exam overnight, bleed expanded, no acute neurosurgical intervention    Review of Systems  Unable to obtain a complete ROS due to level of consciousness.  Objective:     Vitals:  Temp: 97.4 °F (36.3 °C)  Pulse: (!) 59  Rhythm: sinus bradycardia  BP: (!) 201/68  ICP Mean (mmHg): 34 mmHg  Resp: 17  SpO2: 99 %  Oxygen Concentration (%): 40  O2 Device (Oxygen Therapy): ventilator  Vent Mode: A/C  Set Rate: 12 bmp  Vt Set: 440 mL  Pressure Support: 0 cmH20  PEEP/CPAP: 5 cmH20  Peak Airway Pressure: 24 cmH2O  Mean Airway Pressure: 8.9 cmH20  Plateau Pressure: 0 cmH20    Temp  Min: 97.4 °F (36.3 °C)  Max: 99 °F (37.2 °C)  Pulse  Min: 48  Max: 123  BP  Min: 112/57  Max: 201/68  MAP (mmHg)  Min: 79  Max: 105  ICP Mean (mmHg)  Min: 2 mmHg  Max: 41 mmHg  Resp  Min: 12  Max: 28  SpO2  Min: 95 %  Max: 100 %  Oxygen Concentration (%)  Min: 40  Max: 100    05/24 0701 - 05/25 0700  In: 2580 [I.V.:1320]  Out: 3631 [Urine:3325; Drains:306]   Unmeasured Output  Stool Occurrence: 0       Physical Exam   Constitutional: She appears well-developed and well-nourished.   HENT:   EVD in place   Neck: Neck supple.   Cardiovascular: Normal rate.    Pulmonary/Chest:   Mechanical BS throughout   Abdominal: She exhibits no distension.   Neurological: She is unresponsive. GCS eye subscore is 1. GCS verbal subscore is 1. GCS motor subscore is 4.   Pupils fixed and nonreactive  Corneals intact BL  Cough/gag intact  OC intact  WD to noxious BL LE  Flaccid paralysis BL UE  Intermittently breathing over the vent         Medications:  Continuous  niCARdipine Last Rate: Stopped (05/24/18 2101)   niCARdipine Last Rate: Stopped (05/25/18 0901)   norepinephrine bitartrate-D5W Last Rate: 0.38 mcg/kg/min (05/25/18 1001)   propofol    Scheduled  aspirin 81 mg Daily   famotidine 20 mg BID   levetiracetam IVPB 500 mg Q12H   lidocaine-EPINEPHrine 1%-1:100,000 20 mL Once   niMODipine 60 mg Q4H   senna-docusate 8.6-50 mg 1  tablet BID   sodium chloride 0.9% 3 mL Q8H   PRN  hydrALAZINE 10 mg Q4H PRN   labetalol 10 mg Q4H PRN   magnesium oxide 800 mg PRN   magnesium oxide 800 mg PRN   potassium chloride 10% 40 mEq PRN   potassium chloride 10% 40 mEq PRN   potassium chloride 10% 40 mEq PRN   potassium, sodium phosphates 2 packet PRN   potassium, sodium phosphates 2 packet PRN   potassium, sodium phosphates 2 packet PRN   sodium chloride 0.9% 5 mL PRN         Diet  Diet NPO  Diet NPO      Pulse: (!) 59 (05/25/18 1001)  Resp: 17 (05/25/18 1001)  BP: (!) 201/68 (05/25/18 0925)  SpO2: 99 % (05/25/18 1001)      ICP Mean (mmHg):  [2 mmHg-41 mmHg] 34 mmHg  CPP (mmHg calculated using NBP):  [89] 89  CPP:  [38 mmHg-115 mmHg] 50 mmHg    Vent Mode: A/C  Oxygen Concentration (%):  [] 40  Resp Rate Total:  [12 br/min-26 br/min] 17 br/min  Vt Set:  [400 mL-440 mL] 440 mL  PEEP/CPAP:  [5 cmH20] 5 cmH20  Pressure Support:  [0 cmH20] 0 cmH20  Mean Airway Pressure:  [8.9 gaD70-65 cmH20] 8.9 cmH20    05/24 0701 - 05/25 0700  In: 2580 [I.V.:1320]  Out: 3631 [Urine:3325; Drains:306]       1  05/24 0701 - 05/25 0700  In: 2580 [I.V.:1320]  Out: 3631 [Urine:3325; Drains:306]     Recent Labs  Lab 05/25/18  0336   PH 7.382   PCO2 30.1*   PO2 115*   BE -7     Recent Labs  Lab 05/25/18 0427      K 3.1*   *   CO2 18*   BUN 9   CREATININE 0.8   CALCIUM 8.7   MG 2.1   PHOS 1.6*     Recent Labs  Lab 05/25/18 0427   WBC 15.38*   HGB 14.3      INR 1.0   APTT 21.6     Recent Labs  Lab 05/25/18 0427   PROT 7.3   ALBUMIN 3.5   AST 32   ALT 32   ALKPHOS 138*   BILITOT 0.6     Recent Labs      05/25/18   0436  05/25/18   0438   POCTGLUCOSE  255*  300*     Continuous  niCARdipine Last Rate: Stopped (05/24/18 2101)   niCARdipine Last Rate: Stopped (05/25/18 0901)   norepinephrine bitartrate-D5W Last Rate: 0.38 mcg/kg/min (05/25/18 1001)   propofol    Scheduled  aspirin 81 mg Daily   famotidine 20 mg BID   levetiracetam IVPB 500 mg Q12H    lidocaine-EPINEPHrine 1%-1:100,000 20 mL Once   niMODipine 60 mg Q4H   senna-docusate 8.6-50 mg 1 tablet BID   sodium chloride 0.9% 3 mL Q8H   PRN  hydrALAZINE 10 mg Q4H PRN   labetalol 10 mg Q4H PRN   magnesium oxide 800 mg PRN   magnesium oxide 800 mg PRN   potassium chloride 10% 40 mEq PRN   potassium chloride 10% 40 mEq PRN   potassium chloride 10% 40 mEq PRN   potassium, sodium phosphates 2 packet PRN   potassium, sodium phosphates 2 packet PRN   potassium, sodium phosphates 2 packet PRN   sodium chloride 0.9% 5 mL PRN           Lines/Drains/Airways     Drain                 ICP/Ventriculostomy 05/24/18 1524 Ventricular drainage catheter Right Occipital region less than 1 day         NG/OG Tube 05/24/18 2000 orogastric Center mouth less than 1 day         Urethral Catheter 05/24/18 1940 Coude less than 1 day          Airway                 Airway - Non-Surgical 05/24/18 1432 Endotracheal Tube less than 1 day          Arterial Line                 Arterial Line 05/24/18 1355 Right Brachial less than 1 day          Peripheral Intravenous Line                 Peripheral IV - Single Lumen 05/24/18 1042 Left Antecubital 1 day         Peripheral IV - Single Lumen 05/24/18 Right Hand 1 day

## 2018-05-25 NOTE — PLAN OF CARE
05/25/18 1121   Discharge Assessment   Assessment Type Discharge Planning Assessment   Confirmed/corrected address and phone number on facesheet? Yes   Assessment information obtained from? Caregiver   Expected Length of Stay (days) 7   Communicated expected length of stay with patient/caregiver yes   Prior to hospitilization cognitive status: Alert/Oriented   Prior to hospitalization functional status: Independent   Current cognitive status: Coma/Sedated/Intubated   Current Functional Status: Completely Dependent   Lives With spouse   Able to Return to Prior Arrangements unable to determine at this time (comments)   Is patient able to care for self after discharge? Unable to determine at this time (comments)   Who are your caregiver(s) and their phone number(s)? () Tyler Catherineta: [phone], (daughter) Kenihsa Catherineta: [phone]   Patient's perception of discharge disposition other (comments)  (mayela)   Readmission Within The Last 30 Days no previous admission in last 30 days   Patient currently being followed by outpatient case management? No   Patient currently receives any other outside agency services? No   Equipment Currently Used at Home none   Do you have any problems affording any of your prescribed medications? TBD   Is the patient taking medications as prescribed? (mayela)   Does the patient have transportation home? Yes   Transportation Available family or friend will provide   Does the patient receive services at the Coumadin Clinic? No   Discharge Plan A Other  (Per MD .patient with poor prognosis)   Discharge Plan B Other   Patient/Family In Agreement With Plan unable to assess         Discharge/ My Health Packet Folder Given to patient/family:      yes      PCP: none        Pharmacy:    [facility] [place], [place] - [address]  [address] [place] [address]  Phone: [phone] Fax: [phone]        Emergency Contacts:  Extended Emergency Contact Information  Primary  Emergency Contact: Gene Duque  Address: [address] [place] [address] [facility]  Home Phone: [phone]  Mobile Phone: [phone]  Relation: Spouse  Secondary Emergency Contact: Tyler Villa   [facility]  Home Phone: [phone]  Relation: Friend    (daughter) Kenisha Neto: [phone]      Insurance:    Payor: /   NONE

## 2018-05-25 NOTE — PROGRESS NOTES
Hemostasis achieved via right groin with use of angioseal closure device. HOB to remain flat until 2100

## 2018-05-25 NOTE — ASSESSMENT & PLAN NOTE
- SAH s/p coiling with re bleeding overnight - very poor prognosis  - q1h neuro checks  - Keppra 500 mg IV q12h  - Nimodipine 60 mg  q4h  - EVD in place  - plan for family meeting at noon today with  to discuss goals of care

## 2018-05-25 NOTE — PROGRESS NOTES
[facility]  Neurosurgery  Progress Note    Subjective:     History of Present Illness:  is a 67 y.o. female with a PMHx of HTN, who [place] [facility] [place] from [facility] due to SAH and rim calcified aneurysm. She originally presented to the OSH with complaints of severe frontal HA's that radiated into the back of her head. These headaches, along with nausea and emesis, began around 09:00 this morning. She denies any known family history of aneurysms. Denies taking any blood thinners.     Of note, patient is Cymraes speaking only. History obtained via [employer].       Post-Op Info:  Procedure(s) (LRB):  Angiogram-Cerebral (N/A)   1 Day Post-Op     Interval History: Yesterday, taken to IR and aneurysm coiled. 1 hour after procedure, EVD drainage became BRB and ICP in the 50s. CTH demonstrated increased hemorrhage concerning for rehemorrhage. Neurological exam declined and family made patient DNR.     Medications:  Continuous Infusions:   niCARdipine Stopped (05/24/18 2101)    niCARdipine Stopped (05/25/18 0901)    norepinephrine bitartrate-D5W 0.38 mcg/kg/min (05/25/18 0951)    propofol       Scheduled Meds:   aspirin  81 mg Oral Daily    famotidine  20 mg Per NG tube BID    levetiracetam IVPB  500 mg Intravenous Q12H    lidocaine-EPINEPHrine 1%-1:100,000  20 mL Other Once    niMODipine  60 mg Per NG tube Q4H    senna-docusate 8.6-50 mg  1 tablet Oral BID    sodium chloride 0.9%  3 mL Intravenous Q8H     PRN Meds:hydrALAZINE, labetalol, magnesium oxide, magnesium oxide, potassium chloride 10%, potassium chloride 10%, potassium chloride 10%, potassium, sodium phosphates, potassium, sodium phosphates, potassium, sodium phosphates, sodium chloride 0.9%     Review of Systems  Objective:     Weight: 70.3 kg (154 lb 15.7 oz)  Body mass index is 30.27 kg/m².  Vital Signs (Most Recent):  Temp: 97.4 °F (36.3 °C) (05/25/18 0701)  Pulse: (!) 49 (05/25/18 0923)  Resp: 12  (05/25/18 0923)  BP: (!) 201/68 (05/25/18 0925)  SpO2: 100 % (05/25/18 0923) Vital Signs (24h Range):  Temp:  [97.4 °F (36.3 °C)-99 °F (37.2 °C)] 97.4 °F (36.3 °C)  Pulse:  [] 49  Resp:  [12-28] 12  SpO2:  [95 %-100 %] 100 %  BP: (112-201)/(56-78) 201/68  Arterial Line BP: (107-181)/(44-80) 181/67       Date 05/25/18 0700 - 05/26/18 0659   Shift 5371-5727 1159-7547 2747-7559 24 Hour Total   I  N  T  A  K  E   I.V.  (mL/kg) 295.3  (4.2)   295.3  (4.2)    Shift Total  (mL/kg) 295.3  (4.2)   295.3  (4.2)   O  U  T  P  U  T   Urine  (mL/kg/hr) 620   620    Drains 25 25    Shift Total  (mL/kg) 645  (9.2)   645  (9.2)   Weight (kg) 70.3 70.3 70.3 70.3              Vent Mode: A/C  Oxygen Concentration (%):  [] 40  Resp Rate Total:  [12 br/min-26 br/min] 12 br/min  Vt Set:  [400 mL-440 mL] 440 mL  PEEP/CPAP:  [5 cmH20] 5 cmH20  Pressure Support:  [0 cmH20] 0 cmH20  Mean Airway Pressure:  [8.9 ytE50-45 cmH20] 8.9 cmH20         NG/OG Tube 05/24/18 2000 orogastric Center mouth (Active)   Placement Check placement verified by aspirate characteristics;placement verified by distal tube length measurement;placement verified by x-ray;physician notified 5/25/2018  3:01 AM   Advancement advanced manually 5/25/2018  3:01 AM   Distal Tube Length (cm) 65 5/24/2018  7:39 PM   Tolerance no signs/symptoms of discomfort 5/25/2018  3:01 AM   Securement taped to commercial device 5/25/2018  3:01 AM   Clamp Status/Tolerance clamped 5/25/2018  3:01 AM   Insertion Site Appearance no redness, warmth, tenderness, skin breakdown, drainage 5/25/2018  3:01 AM   Flush/Irrigation flushed w/;water;no resistance met 5/25/2018  3:01 AM   Current Rate (mL/hr) 0 mL/hr 5/24/2018  7:39 PM   Goal Rate (mL/hr) 0 mL/hr 5/24/2018  7:39 PM   Intake (mL) 60 mL 5/25/2018  6:01 AM            Urethral Catheter 05/24/18 1940 [place] (Active)   Site Assessment Clean;Intact 5/25/2018  3:01 AM   Collection Container Urimeter 5/25/2018  3:01 AM   Securement  "Method secured to top of thigh w/ adhesive device 5/25/2018  3:01 AM   Catheter Care Performed yes 5/25/2018  3:01 AM   Reason for Continuing Urinary Catheterization Critically ill in ICU requiring intensive monitoring;Urinary retention 5/25/2018  3:01 AM   CAUTI Prevention Bundle StatLock in place w 1" slack;Intact seal between catheter & drainage tubing;Drainage bag off the floor;Green sheeting clip in use;No dependent loops or kinks;Drainage bag not overfilled (<2/3 full);Drainage bag below bladder 5/24/2018  7:39 PM   Output (mL) 190 mL 5/25/2018  9:01 AM            ICP/Ventriculostomy 05/24/18 1524 Ventricular drainage catheter Right Occipital region (Active)   Level of Ventriculostomy (cm above) 15 5/24/2018  7:39 PM   Status Open to drainage 5/25/2018  3:01 AM   Site Assessment Bloody drainage 5/25/2018  3:01 AM   Site Drainage Bloody 5/25/2018  3:01 AM   Waveform continuous waveform displayed 5/24/2018  7:39 PM   Output (mL) 5 mL 5/25/2018  9:01 AM   CSF Color red 5/25/2018  3:01 AM   Dressing Status Biopatch in place;New drainage;Intact 5/25/2018  3:01 AM   Interventions HOB degrees;bed controls locked;level adjusted per order;zeroed 5/25/2018  3:01 AM       Neurosurgery Physical Exam    GCS [id number]  Intubated  Pupils fixed and 3mm. +corneals, +cough  Withdrawing BUE. No movement BLE.   EVD in place and draining.     Significant Labs:    Recent Labs  Lab 05/24/18  1011 05/25/18  0427   * 345*    145   K 3.9 3.1*    114*   CO2 23 18*   BUN 15 9   CREATININE 0.7 0.8   CALCIUM 8.9 8.7   MG  --  2.1       Recent Labs  Lab 05/24/18  1011 05/25/18  0427   WBC 7.43 15.38*   HGB 14.7 14.3   HCT 44.4 43.5    283       Recent Labs  Lab 05/24/18  1011 05/24/18  1309 05/25/18  0427   INR 1.0 1.0 1.0   APTT <21.0 22.2 21.6     Microbiology Results (last 7 days)     ** No results found for the last 168 hours. **        All pertinent labs from the last 24 hours have been reviewed.    Significant " Diagnostics:  CT: Ct Head Without Contrast    Result Date: 5/24/2018  Status post left posterior communicating artery aneurysm coiling for ruptured PCOM aneurysm with multicompartmental hemorrhage as detailed above. Extensive edema and mass effect results in approximately 1.0 cm of rightward midline shift. Status post right frontal coursing ventriculostomy catheter.  No hydrocephalus. Chronic right maxillary sinusitis. Electronically signed by resident: Primo Doss Date:    05/24/2018 Time:    21:17 Electronically signed by: Yong Rojas MD Date:    05/24/2018 Time:    21:48    Ct Head Without Contrast    Result Date: 5/24/2018  Compared to the prior from the same date at 10:24 a.m., there has been interval increase in diffuse subarachnoid blood products concerning for aneurysmal re-rupture, as detailed above. Round hyperattenuating peripherally calcified lesion in the medial left middle cranial fossa suggestive of a large PCOM aneurysm.  Extra-axial mass thought to be less likely.  Further evaluation with CT or conventional angiogram recommended. Interval right frontal ventriculostomy catheter placement with stable size of the ventricles. New minimal intraventricular hemorrhage in the occipital horns of the lateral ventricles. No acute intraparenchymal hemorrhage or major vascular territory infarct. This report was flagged in Epic as abnormal. COMMUNICATION This critical result was discovered/received at 16 40.  The critical information above was relayed directly by me by telephone to YI Walsh on 05/24/2018 at 1645. Electronically signed by resident: Matthew Babin Date:    05/24/2018 Time:    16:25 Electronically signed by: Chris Mcgill MD Date:    05/24/2018 Time:    17:15    Assessment/Plan:     * Subarachnoid hemorrhage    67 year old female with PMHx of HTN, with an acute onset of severe frontal headaches and N/V, found to have a SAH and probable rim calcified aneurysm on HCT. Now s/p EVD and  coiling with likely rerupture and subsequent neuro decline.    -- Patient with poor clinical exam, poor prognosis, discuss goals of care wit family.   -- Continue SAH protocol.  -- EVD open at 10  -- Ancef while drain in place  -- Nimotop 60q4h.  -- Daily TCDs.  --  - 220, permissive hypertension.  -- Maintain euvolemia, 3L in/3L out.  -- Goal  - 150.  -- Neurochecks q1h, close monitoring for vasospasm.  -- Call NSGY immediately with any change in neurological exam.              Tushar Landis MD  Neurosurgery  [facility]

## 2018-05-25 NOTE — SUBJECTIVE & OBJECTIVE
Neurologic Chief Complaint: SAH    Subjective:     Interval History: Patient is seen for follow-up neurological assessment and treatment recommendations: Neurologic decline overnight with concern for re-rupture of aneurysm    HPI, Past Medical, Family, and Social History remains the same as documented in the initial encounter.     Review of Systems   Constitutional: Negative for fever.   Respiratory:        Intubated   Gastrointestinal: Negative for vomiting.   Skin: Negative for rash.   Neurological: Positive for speech difficulty and weakness.     Scheduled Meds:   aspirin  81 mg Oral Daily    famotidine  20 mg Per NG tube BID    levetiracetam IVPB  500 mg Intravenous Q12H    lidocaine-EPINEPHrine 1%-1:100,000  20 mL Other Once    niMODipine  60 mg Per NG tube Q4H    senna-docusate 8.6-50 mg  1 tablet Oral BID    sodium chloride 0.9%  3 mL Intravenous Q8H     Continuous Infusions:   niCARdipine Stopped (05/24/18 2101)    niCARdipine Stopped (05/25/18 0901)    norepinephrine bitartrate-D5W 0.38 mcg/kg/min (05/25/18 1001)    propofol       PRN Meds:hydrALAZINE, labetalol, magnesium oxide, magnesium oxide, potassium chloride 10%, potassium chloride 10%, potassium chloride 10%, potassium, sodium phosphates, potassium, sodium phosphates, potassium, sodium phosphates, sodium chloride 0.9%    Objective:     Vital Signs (Most Recent):  Temp: 97.4 °F (36.3 °C) (05/25/18 0701)  Pulse: (!) 59 (05/25/18 1001)  Resp: 17 (05/25/18 1001)  BP: (!) 201/68 (05/25/18 0925)  SpO2: 99 % (05/25/18 1001)  BP Location: Left arm    Vital Signs Range (Last 24H):  Temp:  [97.4 °F (36.3 °C)-99 °F (37.2 °C)]   Pulse:  []   Resp:  [12-28]   BP: (112-201)/(56-74)   SpO2:  [95 %-100 %]   Arterial Line BP: (107-181)/(44-80)   BP Location: Left arm    Physical Exam   Constitutional: She appears well-developed and well-nourished. No distress.   HENT:   Head: Normocephalic and atraumatic.   Cardiovascular: Bradycardia present.     Pulmonary/Chest: No respiratory distress.   intubated   Skin: Skin is warm and dry.   Vitals reviewed.      Neurological Exam:   LOC: comatose  Attention Span: poor  Language: Intubated  Articulation: Untestable due to intubation  Orientation: Untestable due to intubation  Visual Fields: Bilateral visual loss/blindness  no blink ot threat  EOM (CN III, IV, VI): Oculocephalic Reflex  Normal  Pupils (CN II, III): 6 mm bilaterally, nonreactive  Facial Sensation (CN V): Corneal reflex present Bilateral  Facial Movement (CN VII): Unable to test   Motor: TF in lower extremities, extensor posturing upper extremities  Sensation: TF and posturing to painful stimuli  Tone: Normal tone throughout    Laboratory:  CMP:   Recent Labs  Lab 05/25/18  0427   CALCIUM 8.7   ALBUMIN 3.5   PROT 7.3      K 3.1*   CO2 18*   *   BUN 9   CREATININE 0.8   ALKPHOS 138*   ALT 32   AST 32   BILITOT 0.6     CBC:   Recent Labs  Lab 05/25/18  0427   WBC 15.38*   RBC 4.68   HGB 14.3   HCT 43.5      MCV 93   MCH 30.6   MCHC 32.9     Lipid Panel:   Recent Labs  Lab 05/24/18  1309   CHOL 185   LDLCALC 124.8   HDL 31*   TRIG 146     Coagulation:   Recent Labs  Lab 05/25/18  0427   INR 1.0   APTT 21.6     Platelet Aggregation Study: No results for input(s): PLTAGG, PLTAGINTERP, PLTAGREGLACO, ADPPLTAGGREG in the last 168 hours.  Hgb A1C:   Recent Labs  Lab 05/24/18  1309   HGBA1C 5.6     TSH:   Recent Labs  Lab 05/24/18  1309   TSH 8.928*       Diagnostic Results        Brain imaging:  CT Head. Date: 05/24/18 2114  Status post left posterior communicating artery aneurysm coiling for ruptured PCOM aneurysm with multicompartmental hemorrhage as detailed above.    Extensive edema and mass effect results in approximately 1.0 cm of rightward midline shift.    Status post right frontal coursing ventriculostomy catheter.  No hydrocephalus.    Chronic right maxillary sinusitis.      CT Head. Date: 05/24/18 1616  Compared to the prior from  the same date at 10:24 a.m., there has been interval increase in diffuse subarachnoid blood products concerning for aneurysmal re-rupture, as detailed above.    Round hyperattenuating peripherally calcified lesion in the medial left middle cranial fossa suggestive of a large PCOM aneurysm.  Extra-axial mass thought to be less likely.  Further evaluation with CT or conventional angiogram recommended.    Interval right frontal ventriculostomy catheter placement with stable size of the ventricles.    New minimal intraventricular hemorrhage in the occipital horns of the lateral ventricles.    No acute intraparenchymal hemorrhage or major vascular territory infarct.     Vessel Imaging:  IR Angio. Date: 05/24/18  There was a large ruptured left pcom aneurysm, also a small unruptured right pcom aneurysm.     The large left pcom aneurysm was successfully coiled.     Cardiac Evaluation:   Pending

## 2018-05-25 NOTE — SIGNIFICANT EVENT
Death Note    Called to bedside by patient's nurse. Nursing supervisor notified. Family at bedside.  has been called and is also at bedside.    Patient is not responding to verbal or tactile stimuli. Patient does not have a papillary or corneal reflex. Her pupils are fixed and dilated. No heart or breath sounds on auscultation. No respirations. No palpable pulses.     Time of death:  16:32  05/25/2018    Cause of Death:  Subarachnoid hemorrhage       Shelby Andre, PGY1

## 2018-05-25 NOTE — ASSESSMENT & PLAN NOTE
67 year old female with PMHx of HTN, with an acute onset of severe frontal headaches and N/V, found to have a SAH and probable rim calcified aneurysm on HCT. Now s/p EVD and coiling with likely rerupture and subsequent neuro decline.    -- Patient with poor clinical exam, poor prognosis, discuss goals of care wit family.   -- Continue SAH protocol.  -- EVD open at 10  -- Ancef while drain in place  -- Nimotop 60q4h.  -- Daily TCDs.  --  - 220, permissive hypertension.  -- Maintain euvolemia, 3L in/3L out.  -- Goal  - 150.  -- Neurochecks q1h, close monitoring for vasospasm.  -- Call NSGY immediately with any change in neurological exam.

## 2018-05-25 NOTE — PROGRESS NOTES
[facility]  Vascular Neurology  Comprehensive Stroke Center  Progress Note    Assessment/Plan:     * Subarachnoid hemorrhage    66 yo F with HTN and HLD who is [place] [facility] [place] [facility] with subarachnoid hemorrhage noted on CT Head. Patient went to IR with coioling of L pcomm aneurysm    Antithrombotics for secondary stroke prevention: Antiplatelets: None: SAH    Statins for secondary stroke prevention and hyperlipidemia, if present:   Statins: Atorvastatin- 40 mg daily    Aggressive risk factor modification: HTN, DM     Rehab efforts: Occupational Therapy, PT/OT/SLP to evaluate and treat    Diagnostics ordered/pending: TTE to assess cardiac function/status     VTE prophylaxis: None: Reason for No Pharmacological VTE Prophylaxis: History of systemic or intracranial bleeding    BP parameters: SAH: Secured aneurysm, no target, increase BP to prevent vasospasm if needed             Essential hypertension    Stroke risk factor  management per primary team  Currently requiring pressors to maintain a MAP >70             Admit to NCCU   05/25 overnight patient with worsening neurologic status. Neurosurgery contacted due to increased ICP with blood in EVD. CT head with worsening IVH and midline shift. Found to have fixed pupils. Concern for re-rupture of aneurysm. Family decided not to proceed with further intervention and changed patient to partial code.     STROKE DOCUMENTATION        NIH Scale:  1a. Level Of Consciousness: 3-->Responds only with reflex motor or autonomic effects or totally unresponsive, flaccid, and areflexic  1b. LOC Questions: 2-->Answers neither question correctly  1c. LOC Commands: 2-->Performs neither task correctly  2. Best Gaze: 0-->Normal  3. Visual: 3-->Bilateral hemianopia (blind including cortical blindness)  4. Facial Palsy: 0-->Normal symmetrical movements (difficulty to assess 2/2 intubation)  5a. Motor Arm, Left: 4-->No movement  5b. Motor Arm, Right:  4-->No movement  6a. Motor Leg, Left: 4-->No movement  6b. Motor Leg, Right: 4-->No movement  7. Limb Ataxia: 0-->Absent  8. Sensory: 0-->Normal: no sensory loss  9. Best Language: 3-->Mute, global aphasia: no usable speech or auditory comprehension  10. Dysarthria: (UN) Intubated or other physical barrier  11. Extinction and Inattention (formerly Neglect): 2-->Profound laura-inattention/extinction more than 1 modality  Total (NIH Stroke Scale): 31       Modified Forest Score: 1  Steinauer Coma Scale:    ABCD2 Score:    [id number] Score:   HAS -BLED Score:   ICH Score:   Hunt & Wiggins Classification:Grade II     Hemorrhagic change of an Ischemic Stroke: Does this patient have an ischemic stroke with hemorrhagic changes? No     Neurologic Chief Complaint: SAH    Subjective:     Interval History: Patient is seen for follow-up neurological assessment and treatment recommendations: Neurologic decline overnight with concern for re-rupture of aneurysm    HPI, Past Medical, Family, and Social History remains the same as documented in the initial encounter.     Review of Systems   Constitutional: Negative for fever.   Respiratory:        Intubated   Gastrointestinal: Negative for vomiting.   Skin: Negative for rash.   Neurological: Positive for speech difficulty and weakness.     Scheduled Meds:   aspirin  81 mg Oral Daily    famotidine  20 mg Per NG tube BID    levetiracetam IVPB  500 mg Intravenous Q12H    lidocaine-EPINEPHrine 1%-1:100,000  20 mL Other Once    niMODipine  60 mg Per NG tube Q4H    senna-docusate 8.6-50 mg  1 tablet Oral BID    sodium chloride 0.9%  3 mL Intravenous Q8H     Continuous Infusions:   niCARdipine Stopped (05/24/18 2101)    niCARdipine Stopped (05/25/18 0901)    norepinephrine bitartrate-D5W 0.38 mcg/kg/min (05/25/18 1001)    propofol       PRN Meds:hydrALAZINE, labetalol, magnesium oxide, magnesium oxide, potassium chloride 10%, potassium chloride 10%, potassium chloride 10%,  potassium, sodium phosphates, potassium, sodium phosphates, potassium, sodium phosphates, sodium chloride 0.9%    Objective:     Vital Signs (Most Recent):  Temp: 97.4 °F (36.3 °C) (05/25/18 0701)  Pulse: (!) 59 (05/25/18 1001)  Resp: 17 (05/25/18 1001)  BP: (!) 201/68 (05/25/18 0925)  SpO2: 99 % (05/25/18 1001)  BP Location: Left arm    Vital Signs Range (Last 24H):  Temp:  [97.4 °F (36.3 °C)-99 °F (37.2 °C)]   Pulse:  []   Resp:  [12-28]   BP: (112-201)/(56-74)   SpO2:  [95 %-100 %]   Arterial Line BP: (107-181)/(44-80)   BP Location: Left arm    Physical Exam   Constitutional: She appears well-developed and well-nourished. No distress.   HENT:   Head: Normocephalic and atraumatic.   Cardiovascular: Bradycardia present.    Pulmonary/Chest: No respiratory distress.   intubated   Skin: Skin is warm and dry.   Vitals reviewed.      Neurological Exam:   LOC: comatose  Attention Span: poor  Language: Intubated  Articulation: Untestable due to intubation  Orientation: Untestable due to intubation  Visual Fields: Bilateral visual loss/blindness  no blink ot threat  EOM (CN III, IV, VI): Oculocephalic Reflex  Normal  Pupils (CN II, III): 6 mm bilaterally, nonreactive  Facial Sensation (CN V): Corneal reflex present Bilateral  Facial Movement (CN VII): Unable to test   Motor: TF in lower extremities, extensor posturing upper extremities  Sensation: TF and posturing to painful stimuli  Tone: Normal tone throughout    Laboratory:  CMP:   Recent Labs  Lab 05/25/18  0427   CALCIUM 8.7   ALBUMIN 3.5   PROT 7.3      K 3.1*   CO2 18*   *   BUN 9   CREATININE 0.8   ALKPHOS 138*   ALT 32   AST 32   BILITOT 0.6     CBC:   Recent Labs  Lab 05/25/18  0427   WBC 15.38*   RBC 4.68   HGB 14.3   HCT 43.5      MCV 93   MCH 30.6   MCHC 32.9     Lipid Panel:   Recent Labs  Lab 05/24/18  1309   CHOL 185   LDLCALC 124.8   HDL 31*   TRIG 146     Coagulation:   Recent Labs  Lab 05/25/18  0427   INR 1.0   APTT 21.6      Platelet Aggregation Study: No results for input(s): PLTAGG, PLTAGINTERP, PLTAGREGLACO, ADPPLTAGGREG in the last 168 hours.  Hgb A1C:   Recent Labs  Lab 05/24/18  1309   HGBA1C 5.6     TSH:   Recent Labs  Lab 05/24/18  1309   TSH 8.928*       Diagnostic Results        Brain imaging:  CT Head. Date: 05/24/18 2114  Status post left posterior communicating artery aneurysm coiling for ruptured PCOM aneurysm with multicompartmental hemorrhage as detailed above.    Extensive edema and mass effect results in approximately 1.0 cm of rightward midline shift.    Status post right frontal coursing ventriculostomy catheter.  No hydrocephalus.    Chronic right maxillary sinusitis.      CT Head. Date: 05/24/18 1616  Compared to the prior from the same date at 10:24 a.m., there has been interval increase in diffuse subarachnoid blood products concerning for aneurysmal re-rupture, as detailed above.    Round hyperattenuating peripherally calcified lesion in the medial left middle cranial fossa suggestive of a large PCOM aneurysm.  Extra-axial mass thought to be less likely.  Further evaluation with CT or conventional angiogram recommended.    Interval right frontal ventriculostomy catheter placement with stable size of the ventricles.    New minimal intraventricular hemorrhage in the occipital horns of the lateral ventricles.    No acute intraparenchymal hemorrhage or major vascular territory infarct.     Vessel Imaging:  IR Angio. Date: 05/24/18  There was a large ruptured left pcom aneurysm, also a small unruptured right pcom aneurysm.     The large left pcom aneurysm was successfully coiled.     Cardiac Evaluation:   Pending                Amara Jacobsen PA-C  [facility]  Department of Vascular Neurology   [facility]

## 2018-05-25 NOTE — PROGRESS NOTES
Patient transported to CT on transport vent with documented settings. Returned to room and placed back on vent. Ambu bag and mask at bedside. Will continue to monitor.

## 2018-05-25 NOTE — PT/OT/SLP PROGRESS
Physical Therapy  One Discipline Only    Patient Name:  Kathie Lau   MRN:  [id number]  Admitting Diagnosis: Subarachnoid hemorrhage  Recent Surgery: Procedure(s) (LRB):  Angiogram-Cerebral (N/A) 1 Day Post-Op    Plan:     Physical Therapy orders received and acknowledged. Patient is intubated and appropriate for one discipline only. Occupational Therapy to follow and inform PT when appropriate for two disciplines.    Velia Lind PT, DPT  05/25/2018   Pager: [phone]

## 2018-05-25 NOTE — SUBJECTIVE & OBJECTIVE
Interval History: Yesterday, taken to IR and aneurysm coiled. 1 hour after procedure, EVD drainage became BRB and ICP in the 50s. CTH demonstrated increased hemorrhage concerning for rehemorrhage. Neurological exam declined and family made patient DNR.     Medications:  Continuous Infusions:   niCARdipine Stopped (05/24/18 2101)    niCARdipine Stopped (05/25/18 0901)    norepinephrine bitartrate-D5W 0.38 mcg/kg/min (05/25/18 0951)    propofol       Scheduled Meds:   aspirin  81 mg Oral Daily    famotidine  20 mg Per NG tube BID    levetiracetam IVPB  500 mg Intravenous Q12H    lidocaine-EPINEPHrine 1%-1:100,000  20 mL Other Once    niMODipine  60 mg Per NG tube Q4H    senna-docusate 8.6-50 mg  1 tablet Oral BID    sodium chloride 0.9%  3 mL Intravenous Q8H     PRN Meds:hydrALAZINE, labetalol, magnesium oxide, magnesium oxide, potassium chloride 10%, potassium chloride 10%, potassium chloride 10%, potassium, sodium phosphates, potassium, sodium phosphates, potassium, sodium phosphates, sodium chloride 0.9%     Review of Systems  Objective:     Weight: 70.3 kg (154 lb 15.7 oz)  Body mass index is 30.27 kg/m².  Vital Signs (Most Recent):  Temp: 97.4 °F (36.3 °C) (05/25/18 0701)  Pulse: (!) 49 (05/25/18 0923)  Resp: 12 (05/25/18 0923)  BP: (!) 201/68 (05/25/18 0925)  SpO2: 100 % (05/25/18 0923) Vital Signs (24h Range):  Temp:  [97.4 °F (36.3 °C)-99 °F (37.2 °C)] 97.4 °F (36.3 °C)  Pulse:  [] 49  Resp:  [12-28] 12  SpO2:  [95 %-100 %] 100 %  BP: (112-201)/(56-78) 201/68  Arterial Line BP: (107-181)/(44-80) 181/67       Date 05/25/18 0700 - 05/26/18 0659   Shift 9150-8431 4298-7426 6734-1240 24 Hour Total   I  N  T  A  K  E   I.V.  (mL/kg) 295.3  (4.2)   295.3  (4.2)    Shift Total  (mL/kg) 295.3  (4.2)   295.3  (4.2)   O  U  T  P  U  T   Urine  (mL/kg/hr) 620   620    Drains 25   25    Shift Total  (mL/kg) 645  (9.2)   645  (9.2)   Weight (kg) 70.3 70.3 70.3 70.3              Vent Mode: A/C  Oxygen  "Concentration (%):  [] 40  Resp Rate Total:  [12 br/min-26 br/min] 12 br/min  Vt Set:  [400 mL-440 mL] 440 mL  PEEP/CPAP:  [5 cmH20] 5 cmH20  Pressure Support:  [0 cmH20] 0 cmH20  Mean Airway Pressure:  [8.9 hvH09-64 cmH20] 8.9 cmH20         NG/OG Tube 05/24/18 2000 orogastric Center mouth (Active)   Placement Check placement verified by aspirate characteristics;placement verified by distal tube length measurement;placement verified by x-ray;physician notified 5/25/2018  3:01 AM   Advancement advanced manually 5/25/2018  3:01 AM   Distal Tube Length (cm) 65 5/24/2018  7:39 PM   Tolerance no signs/symptoms of discomfort 5/25/2018  3:01 AM   Securement taped to commercial device 5/25/2018  3:01 AM   Clamp Status/Tolerance clamped 5/25/2018  3:01 AM   Insertion Site Appearance no redness, warmth, tenderness, skin breakdown, drainage 5/25/2018  3:01 AM   Flush/Irrigation flushed w/;water;no resistance met 5/25/2018  3:01 AM   Current Rate (mL/hr) 0 mL/hr 5/24/2018  7:39 PM   Goal Rate (mL/hr) 0 mL/hr 5/24/2018  7:39 PM   Intake (mL) 60 mL 5/25/2018  6:01 AM            Urethral Catheter 05/24/18 1940 [place] (Active)   Site Assessment Clean;Intact 5/25/2018  3:01 AM   Collection Container Urimeter 5/25/2018  3:01 AM   Securement Method secured to top of thigh w/ adhesive device 5/25/2018  3:01 AM   Catheter Care Performed yes 5/25/2018  3:01 AM   Reason for Continuing Urinary Catheterization Critically ill in ICU requiring intensive monitoring;Urinary retention 5/25/2018  3:01 AM   CAUTI Prevention Bundle StatLock in place w 1" slack;Intact seal between catheter & drainage tubing;Drainage bag off the floor;Green sheeting clip in use;No dependent loops or kinks;Drainage bag not overfilled (<2/3 full);Drainage bag below bladder 5/24/2018  7:39 PM   Output (mL) 190 mL 5/25/2018  9:01 AM            ICP/Ventriculostomy 05/24/18 1524 Ventricular drainage catheter Right Occipital region (Active)   Level of Ventriculostomy " (cm above) 15 5/24/2018  7:39 PM   Status Open to drainage 5/25/2018  3:01 AM   Site Assessment Bloody drainage 5/25/2018  3:01 AM   Site Drainage Bloody 5/25/2018  3:01 AM   Waveform continuous waveform displayed 5/24/2018  7:39 PM   Output (mL) 5 mL 5/25/2018  9:01 AM   CSF Color red 5/25/2018  3:01 AM   Dressing Status Biopatch in place;New drainage;Intact 5/25/2018  3:01 AM   Interventions HOB degrees;bed controls locked;level adjusted per order;zeroed 5/25/2018  3:01 AM       Neurosurgery Physical Exam    GCS [id number]  Intubated  Pupils fixed and 3mm. +corneals, +cough  Withdrawing BUE. No movement BLE.   EVD in place and draining.     Significant Labs:    Recent Labs  Lab 05/24/18  1011 05/25/18  0427   * 345*    145   K 3.9 3.1*    114*   CO2 23 18*   BUN 15 9   CREATININE 0.7 0.8   CALCIUM 8.9 8.7   MG  --  2.1       Recent Labs  Lab 05/24/18  1011 05/25/18  0427   WBC 7.43 15.38*   HGB 14.7 14.3   HCT 44.4 43.5    283       Recent Labs  Lab 05/24/18  1011 05/24/18  1309 05/25/18  0427   INR 1.0 1.0 1.0   APTT <21.0 22.2 21.6     Microbiology Results (last 7 days)     ** No results found for the last 168 hours. **        All pertinent labs from the last 24 hours have been reviewed.    Significant Diagnostics:  CT: Ct Head Without Contrast    Result Date: 5/24/2018  Status post left posterior communicating artery aneurysm coiling for ruptured PCOM aneurysm with multicompartmental hemorrhage as detailed above. Extensive edema and mass effect results in approximately 1.0 cm of rightward midline shift. Status post right frontal coursing ventriculostomy catheter.  No hydrocephalus. Chronic right maxillary sinusitis. Electronically signed by resident: Primo Doss Date:    05/24/2018 Time:    21:17 Electronically signed by: Yong Rojas MD Date:    05/24/2018 Time:    21:48    Ct Head Without Contrast    Result Date: 5/24/2018  Compared to the prior from the same date at 10:24 a.m.,  there has been interval increase in diffuse subarachnoid blood products concerning for aneurysmal re-rupture, as detailed above. Round hyperattenuating peripherally calcified lesion in the medial left middle cranial fossa suggestive of a large PCOM aneurysm.  Extra-axial mass thought to be less likely.  Further evaluation with CT or conventional angiogram recommended. Interval right frontal ventriculostomy catheter placement with stable size of the ventricles. New minimal intraventricular hemorrhage in the occipital horns of the lateral ventricles. No acute intraparenchymal hemorrhage or major vascular territory infarct. This report was flagged in Epic as abnormal. COMMUNICATION This critical result was discovered/received at 16 40.  The critical information above was relayed directly by me by telephone to YI Walsh on 05/24/2018 at 1645. Electronically signed by resident: Matthew Babin Date:    05/24/2018 Time:    16:25 Electronically signed by: Chris Mcigll MD Date:    05/24/2018 Time:    17:15

## 2018-05-25 NOTE — PLAN OF CARE
Detailed family discussion held ( Tushar physically present and translating conversation). Patient's  Tyler stated that he understood the gravity of the situation and would like to pursue comfort care measures and make the patient a DNR. He would like their pastoral care giver to come and see patient prior to extubation and initiation of comfort care measures.

## 2018-05-25 NOTE — PLAN OF CARE
Problem: Patient Care Overview  Goal: Plan of Care Review  Outcome: Ongoing (interventions implemented as appropriate)  POC reviewed with Ms. Kathie Lau and family at 0400. Patient unable to verbalize understanding d/t intubation and cognitive status. Questions and concerns addressed with family. Refer to previous notes for details regarding tonight's shift. Levophed currently infusing at 0.3mcg/kg/min to maintain CPPs >50. KIRILL notified of patient's condition. Code status updated - associated paperwork in chart. VS and assessments per flowsheets; will continue to monitor.

## 2018-05-25 NOTE — PROGRESS NOTES
Patient arrived to room at 1939 - from arrival to 2000 EVD drained 93cc of bright red drainage with ICPs sustaining in the 40's-50's. JOY Zamudio notified - awaiting orders. Neurosurgery paged, awaiting page back.

## 2018-05-25 NOTE — PROGRESS NOTES
[facility]  Neurocritical Care  Progress Note    Admit Date: 5/24/2018  Service Date: 05/25/2018  Length of Stay: 1    Subjective:     Chief Complaint: Subarachnoid hemorrhage    History of Present Illness: This history was taken with the aid of a .     68 yo F with HTN and HLD who is transferred to [facility] from [facility] with subarachnoid hemorrhage noted on CT Head.  Pt initially presented to the [facility] ED this morning with complaints of acute onset of severe headache, nausea, and vomiting that started this morning at 0900 while in the shower. She denies any trauma. EMS reported /90 prior to admission, vitals otherwise normal. Pt denies taking her blood pressure medication yet today. She denies taking any blood thinners or aspirin. No prior stroke history.  She denies chest pain, SOB, diarrhea, abdominal pain.      Hospital Course: 5/24: Admitted to Neuro ICU. BP control with cardene gtt. Plan for intubation, EVD placement, angio, and possible crani and coiling.   5/25: acute worsening in exam overnight found to re-bleed; family meeting held, no acute surgical intervention    Interval History:  Acute worsening in exam overnight, bleed expanded, no acute neurosurgical intervention    Review of Systems  Unable to obtain a complete ROS due to level of consciousness.  Objective:     Vitals:  Temp: 97.4 °F (36.3 °C)  Pulse: (!) 59  Rhythm: sinus bradycardia  BP: (!) 201/68  ICP Mean (mmHg): 34 mmHg  Resp: 17  SpO2: 99 %  Oxygen Concentration (%): 40  O2 Device (Oxygen Therapy): ventilator  Vent Mode: A/C  Set Rate: 12 bmp  Vt Set: 440 mL  Pressure Support: 0 cmH20  PEEP/CPAP: 5 cmH20  Peak Airway Pressure: 24 cmH2O  Mean Airway Pressure: 8.9 cmH20  Plateau Pressure: 0 cmH20    Temp  Min: 97.4 °F (36.3 °C)  Max: 99 °F (37.2 °C)  Pulse  Min: 48  Max: 123  BP  Min: 112/57  Max: 201/68  MAP (mmHg)  Min: 79  Max: 105  ICP Mean (mmHg)  Min: 2 mmHg  Max: 41 mmHg  Resp  Min: 12   Max: 28  SpO2  Min: 95 %  Max: 100 %  Oxygen Concentration (%)  Min: 40  Max: 100    05/24 0701 - 05/25 0700  In: 2580 [I.V.:1320]  Out: 3631 [Urine:3325; Drains:306]   Unmeasured Output  Stool Occurrence: 0       Physical Exam   Constitutional: She appears well-developed and well-nourished.   HENT:   EVD in place   Neck: Neck supple.   Cardiovascular: Normal rate.    Pulmonary/Chest:   Mechanical BS throughout   Abdominal: She exhibits no distension.   Neurological: She is unresponsive. GCS eye subscore is 1. GCS verbal subscore is 1. GCS motor subscore is 4.   Pupils fixed and nonreactive  Corneals intact BL  Cough/gag intact  OC intact  WD to noxious BL LE  Flaccid paralysis BL UE  Intermittently breathing over the vent         Medications:  Continuous  niCARdipine Last Rate: Stopped (05/24/18 2101)   niCARdipine Last Rate: Stopped (05/25/18 0901)   norepinephrine bitartrate-D5W Last Rate: 0.38 mcg/kg/min (05/25/18 1001)   propofol    Scheduled  aspirin 81 mg Daily   famotidine 20 mg BID   levetiracetam IVPB 500 mg Q12H   lidocaine-EPINEPHrine 1%-1:100,000 20 mL Once   niMODipine 60 mg Q4H   senna-docusate 8.6-50 mg 1 tablet BID   sodium chloride 0.9% 3 mL Q8H   PRN  hydrALAZINE 10 mg Q4H PRN   labetalol 10 mg Q4H PRN   magnesium oxide 800 mg PRN   magnesium oxide 800 mg PRN   potassium chloride 10% 40 mEq PRN   potassium chloride 10% 40 mEq PRN   potassium chloride 10% 40 mEq PRN   potassium, sodium phosphates 2 packet PRN   potassium, sodium phosphates 2 packet PRN   potassium, sodium phosphates 2 packet PRN   sodium chloride 0.9% 5 mL PRN         Diet  Diet NPO  Diet NPO      Pulse: (!) 59 (05/25/18 1001)  Resp: 17 (05/25/18 1001)  BP: (!) 201/68 (05/25/18 0925)  SpO2: 99 % (05/25/18 1001)      ICP Mean (mmHg):  [2 mmHg-41 mmHg] 34 mmHg  CPP (mmHg calculated using NBP):  [89] 89  CPP:  [38 mmHg-115 mmHg] 50 mmHg    Vent Mode: A/C  Oxygen Concentration (%):  [] 40  Resp Rate Total:  [12 br/min-26 br/min]  17 br/min  Vt Set:  [400 mL-440 mL] 440 mL  PEEP/CPAP:  [5 cmH20] 5 cmH20  Pressure Support:  [0 cmH20] 0 cmH20  Mean Airway Pressure:  [8.9 jhT95-10 cmH20] 8.9 cmH20    05/24 0701 - 05/25 0700  In: 2580 [I.V.:1320]  Out: 3631 [Urine:3325; Drains:306]       1  05/24 0701 - 05/25 0700  In: 2580 [I.V.:1320]  Out: 3631 [Urine:3325; Drains:306]     Recent Labs  Lab 05/25/18  0336   PH 7.382   PCO2 30.1*   PO2 115*   BE -7     Recent Labs  Lab 05/25/18  0427      K 3.1*   *   CO2 18*   BUN 9   CREATININE 0.8   CALCIUM 8.7   MG 2.1   PHOS 1.6*     Recent Labs  Lab 05/25/18 0427   WBC 15.38*   HGB 14.3      INR 1.0   APTT 21.6     Recent Labs  Lab 05/25/18 0427   PROT 7.3   ALBUMIN 3.5   AST 32   ALT 32   ALKPHOS 138*   BILITOT 0.6     Recent Labs      05/25/18   0436  05/25/18   0438   POCTGLUCOSE  255*  300*     Continuous  niCARdipine Last Rate: Stopped (05/24/18 2101)   niCARdipine Last Rate: Stopped (05/25/18 0901)   norepinephrine bitartrate-D5W Last Rate: 0.38 mcg/kg/min (05/25/18 1001)   propofol    Scheduled  aspirin 81 mg Daily   famotidine 20 mg BID   levetiracetam IVPB 500 mg Q12H   lidocaine-EPINEPHrine 1%-1:100,000 20 mL Once   niMODipine 60 mg Q4H   senna-docusate 8.6-50 mg 1 tablet BID   sodium chloride 0.9% 3 mL Q8H   PRN  hydrALAZINE 10 mg Q4H PRN   labetalol 10 mg Q4H PRN   magnesium oxide 800 mg PRN   magnesium oxide 800 mg PRN   potassium chloride 10% 40 mEq PRN   potassium chloride 10% 40 mEq PRN   potassium chloride 10% 40 mEq PRN   potassium, sodium phosphates 2 packet PRN   potassium, sodium phosphates 2 packet PRN   potassium, sodium phosphates 2 packet PRN   sodium chloride 0.9% 5 mL PRN           Lines/Drains/Airways     Drain                 ICP/Ventriculostomy 05/24/18 1524 Ventricular drainage catheter Right Occipital region less than 1 day         NG/OG Tube 05/24/18 2000 orogastric Center mouth less than 1 day         Urethral Catheter 05/24/18 1940 Coude less than 1  day          Airway                 Airway - Non-Surgical 05/24/18 1432 Endotracheal Tube less than 1 day          Arterial Line                 Arterial Line 05/24/18 1355 Right Brachial less than 1 day          Peripheral Intravenous Line                 Peripheral IV - Single Lumen 05/24/18 1042 Left Antecubital 1 day         Peripheral IV - Single Lumen 05/24/18 Right Hand 1 day                        Assessment/Plan:     Neuro   * Subarachnoid hemorrhage    - SAH s/p coiling with re bleeding overnight - very poor prognosis  - q1h neuro checks  - Keppra 500 mg IV q12h  - Nimodipine 60 mg  q4h  - EVD in place  - plan for family meeting at noon today with  to discuss goals of care          Brain compression    See SAH        Watton coma scale total score 3-8    See SAH        Cerebral aneurysm rupture    - see SAH        Ophtho   Fixed pupil of both eyes    Due to SAH        Cardiac/Vascular   Essential hypertension    - Cardene gtt  - Goal -140            Prophylaxis:  Venous Thromboembolism: mechanical  Stress Ulcer: H2B  Ventilator Pneumonia: yes     Activity Orders          None        Full Code    Duncan Daniel MD  Neurocritical Care  [facility]

## 2018-05-25 NOTE — LOPA/MORA/SWTA/AOC/AEB
Thank you for this referral. At this time, Ms. Lau is a candidate for organ, tissue, and eye donation. Please call [facility] at [phone] if plans are made to perform any brain death testing, if plans are made to discuss withdrawal of mechanical support with her family, or if she any change in status or is made a DNR.     Marian Dowling, RN, BSN, CCRN  [facility]

## 2018-05-25 NOTE — PROGRESS NOTES
2115: Patient transported to and from CT with RN, RT, and PCT. Scan completed without incident. Patient safely returned to room 7067 - upon arrival to room, neurosurgery MD and YI Maloney, awaiting. Patient connected to bedside monitor and ventilator, bed in lowest position with siderails raised and wheels locked.     2140: ICPs remain in the 30's-40's, sustained, at this time. Neurosurgery MD flushed EVD without an improvement in ICP.    2200: MD Garrett ordered 75g of Mannitol to be administered IVP. Medication administered per orders - transient improvement in ICP observed (decreased from 40's to 20's). Also at this time, anisocoria and non-reactivity noted (L: 5, R: 2) - pupils previously 2mm and sluggish, bilaterally. Findings confirmed via pupillometer. ICPs remain elevated at this time (20's-30's) - neurosurgery and MD Garrett aware. MDs updated patient's family of patient's status and following discussion, family does not wish to pursue surgical intervention. Levophed infusion ordered by MD Garrett to maintain CPPs >50. No additional orders at this time. VS and assessments per flowsheet; will continue to monitor.

## 2018-05-25 NOTE — ASSESSMENT & PLAN NOTE
Stroke risk factor  management per primary team  Currently requiring pressors to maintain a MAP >70

## 2018-05-25 NOTE — TRANSFER OF CARE
Anesthesia Transfer of Care Note    Patient: Kathie Lau    Procedure(s) Performed: Procedure(s) (LRB):  Angiogram-Cerebral (N/A)    Patient location: ICU    Anesthesia Type: general    Transport from OR: Transported from OR intubated on 100% O2 by AMBU with adequate controlled ventilation. Upon arrival to PACU/ICU, patient attached to ventilator and auscultated to confirm bilateral breath sounds and adequate TV. Continuous ECG monitoring in transport. Continuos invasive BP monitoring in transport. Continuous SpO2 monitoring in transport    Post pain: adequate analgesia    Post assessment: no apparent anesthetic complications and tolerated procedure well    Post vital signs: stable    Level of consciousness: sedated    Nausea/Vomiting: no nausea/vomiting    Complications: none    Transfer of care protocol was followed      Last vitals:   Visit Vitals  BP (!) 112/57 (BP Location: Left arm, Patient Position: Lying)   Pulse 100   Temp 37.2 °C (98.9 °F) (Oral)   Resp (!) 21   Ht 5' (1.524 m)   Wt 70.3 kg (154 lb 15.7 oz)   LMP  (LMP Unknown)   SpO2 100%   Breastfeeding? No   BMI 30.27 kg/m²

## 2018-05-25 NOTE — DISCHARGE SUMMARY
Death Note  Critical Care Medicine      Admit Date: 2018    Date of Death: 2018    Time of Death: 1632    Attending Physician: Jung Tucker MD    Principal Diagnoses: Subarachnoid hemorrhage    Preliminary Cause of Death: Subarachnoid hemorrhage    Secondary Diagnoses:   Active Hospital Problems    Diagnosis  POA    *Subarachnoid hemorrhage [I60.9]  Yes    Ronni coma scale total score 3-8 [R40.2430]  Unknown    Brain compression [G93.5]  Yes    Fixed pupil of both eyes [R29.818]  Unknown    Essential hypertension [I10]  Yes    Primary thunderclap headache [G44.53]  Yes    Cerebral aneurysm rupture [I60.9]  Yes      Resolved Hospital Problems    Diagnosis Date Resolved POA   No resolved problems to display.        Discharged Condition:     HPI:  This history was taken with the aid of a .     66 yo F with HTN and HLD who is transferred to [facility] from [facility] with subarachnoid hemorrhage noted on CT Head.  Pt initially presented to the [facility] ED this morning with complaints of acute onset of severe headache, nausea, and vomiting that started this morning at 0900 while in the shower. She denies any trauma. EMS reported /90 prior to admission, vitals otherwise normal. Pt denies taking her blood pressure medication yet today. She denies taking any blood thinners or aspirin. No prior stroke history.  She denies chest pain, SOB, diarrhea, abdominal pain.      Hospital/ICU Course:  : Admitted to Neuro ICU. BP control with cardene gtt. Intubated. EVD placed at bedside. Sent straight to angio. Pt had successful secured coiling of L PCOM.   : acute worsening in exam overnight found to re-bleed; family meeting held, no acute surgical intervention. After family discussion via [facility], family decided to withdraw care and allow for natural progression of death. Family  arrived for last nights. Then morphine ggt started and pt extubated  without parameters.         Consultations were held with the family regarding the patient's expected poor prognosis. At the direction of the family, the patient was extubated and measures to ensure the comfort of the patient including, but not limited to, morphine as needed for pain and air hunger as well as benzodiazepines as needed for agitation. The patient was subsequently declared dead by Shelby Andre DO on 5/25/18 at 1625.         The above is purely a summary of documentation by other providers. This discharge summary is in no way a substitute for medical documentation that has been provided throughout the stay by care-giving providers. Please reference all documentation in the   Electronic medical record should any questions or concerns arise.       Leonardo Jacobsen PA-C  Neuro Critical Care

## 2018-05-25 NOTE — SIGNIFICANT EVENT
Called at 8:37 pm that patient began draining bright red blood out of ventric and had escalated icps in 50s  Stat ct head ordered  Arrived at hospital at 10:20 pm  On arrival patient had reactive pupils at 3mm  Full oculocephalics  Weak flexion on left, stronger on right  Ct head revealed large new amounts of blood in all ventricles with ballooning of 4th  Amicar 4gm iv x 1 ordered and confirmed with pharmacy  1gm/kg of 25% mannitol given  Exam quickly worsened to 5mm pupil unreactive on left and 2mm unreactive on right  Loss of motor response in arms also occurred  Family declined surgery after discussion in native language by Dr Monson  Patient is now limited DNR with no chest compressions, cardioversion or external pacing  norepi ordered to titrate to CPP 50mmHg  Still waiting for amicar  Pharmacy contacted x3  CRC 80 min

## 2018-05-25 NOTE — ANESTHESIA POSTPROCEDURE EVALUATION
Anesthesia Post Evaluation    Patient: Kathie Lau    Procedure(s) Performed: Procedure(s) (LRB):  Angiogram-Cerebral (N/A)    Final Anesthesia Type: general  Patient location during evaluation: ICU  Patient participation: No - Unable to Participate, Sedation  Level of consciousness: sedated  Post-procedure vital signs: reviewed and stable  Pain management: adequate  Airway patency: patent  PONV status at discharge: No PONV  Anesthetic complications: no      Cardiovascular status: hemodynamically stable  Respiratory status: ventilator  Hydration status: euvolemic  Follow-up not needed.        Visit Vitals  BP (!) 112/57 (BP Location: Left arm, Patient Position: Lying)   Pulse 64   Temp 37.2 °C (98.9 °F) (Oral)   Resp 13   Ht 5' (1.524 m)   Wt 70.3 kg (154 lb 15.7 oz)   LMP  (LMP Unknown)   SpO2 100%   Breastfeeding? No   BMI 30.27 kg/m²       Pain/Lu Score: Pain Assessment Performed: Yes (5/24/2018  3:01 PM)  Presence of Pain: non-verbal indicators absent (5/24/2018  3:01 PM)  Pain Rating Prior to Med Admin: 0 (5/24/2018  2:46 PM)

## 2018-06-12 PROBLEM — R40.2430 GLASGOW COMA SCALE TOTAL SCORE 3-8: Status: ACTIVE | Noted: 2018-06-12

## 2018-06-12 PROBLEM — H57.9: Status: ACTIVE | Noted: 2018-06-12

## 2023-07-25 NOTE — PROGRESS NOTES
Neurosurgery brief note     Neurosurgery called to bedside around 20:03 for elevated ICP post-coiling with bright red blood from EVD and ICP in the 50s. Patient still with paralytics on board s/p anesthesia. STAT CT head ordered and significant for increased IVH and intracranial blood, with new 1 cm midline shift and subdural blood together concerning for re-rupture of aneurysm. Patient re-examined after CT once train of 4s present. R pupil 2 mm, fixed; L pupil 5 mm, fixed. + corneals, OCL, cough/gag. No movement of upper extremities to deep central or peripheral stimulation. + triple flexion bilateral LE. EVD dropped to 10 and draining well; patient given mannitol. HOB elevated.     After eleno and lengthy discussion with patient's family and Dr. Monson in [place], they elect not to proceed with further intervention given that her likely outcome would not be compatible with her desired quality of life. They wish to make her DNR. They do not wish to withdraw care at this time. Plan discussed with neurocritical care team, including Dr. Tucker. All in agreement with family's wishes.        no